# Patient Record
Sex: FEMALE | HISPANIC OR LATINO | URBAN - METROPOLITAN AREA
[De-identification: names, ages, dates, MRNs, and addresses within clinical notes are randomized per-mention and may not be internally consistent; named-entity substitution may affect disease eponyms.]

---

## 2019-09-24 ENCOUNTER — INPATIENT (INPATIENT)
Facility: HOSPITAL | Age: 44
LOS: 2 days | Discharge: HOME | DRG: 532 | End: 2019-09-27
Attending: OBSTETRICS & GYNECOLOGY | Admitting: OBSTETRICS & GYNECOLOGY
Payer: MEDICAID

## 2019-09-24 VITALS — WEIGHT: 169.98 LBS

## 2019-09-24 DIAGNOSIS — N93.9 ABNORMAL UTERINE AND VAGINAL BLEEDING, UNSPECIFIED: ICD-10-CM

## 2019-09-24 LAB
ALBUMIN SERPL ELPH-MCNC: 3.3 G/DL — SIGNIFICANT CHANGE UP (ref 3.3–5)
ALP SERPL-CCNC: 77 U/L — SIGNIFICANT CHANGE UP (ref 40–120)
ALT FLD-CCNC: 16 U/L — SIGNIFICANT CHANGE UP (ref 12–78)
ANION GAP SERPL CALC-SCNC: 10 MMOL/L — SIGNIFICANT CHANGE UP (ref 5–17)
APTT BLD: 23.3 SEC — LOW (ref 27.5–36.3)
AST SERPL-CCNC: 15 U/L — SIGNIFICANT CHANGE UP (ref 15–37)
BASOPHILS # BLD AUTO: 0.01 K/UL — SIGNIFICANT CHANGE UP (ref 0–0.2)
BASOPHILS NFR BLD AUTO: 0.2 % — SIGNIFICANT CHANGE UP (ref 0–2)
BILIRUB SERPL-MCNC: 0.2 MG/DL — SIGNIFICANT CHANGE UP (ref 0.2–1.2)
BUN SERPL-MCNC: 9 MG/DL — SIGNIFICANT CHANGE UP (ref 7–23)
CALCIUM SERPL-MCNC: 8.6 MG/DL — SIGNIFICANT CHANGE UP (ref 8.5–10.1)
CHLORIDE SERPL-SCNC: 106 MMOL/L — SIGNIFICANT CHANGE UP (ref 96–108)
CO2 SERPL-SCNC: 23 MMOL/L — SIGNIFICANT CHANGE UP (ref 22–31)
CREAT SERPL-MCNC: 1.14 MG/DL — SIGNIFICANT CHANGE UP (ref 0.5–1.3)
EOSINOPHIL # BLD AUTO: 0.02 K/UL — SIGNIFICANT CHANGE UP (ref 0–0.5)
EOSINOPHIL NFR BLD AUTO: 0.5 % — SIGNIFICANT CHANGE UP (ref 0–6)
GLUCOSE SERPL-MCNC: 135 MG/DL — HIGH (ref 70–99)
HCG SERPL-ACNC: <1 MIU/ML — SIGNIFICANT CHANGE UP
HCT VFR BLD CALC: 13.4 % — CRITICAL LOW (ref 34.5–45)
HGB BLD-MCNC: 3.5 G/DL — CRITICAL LOW (ref 11.5–15.5)
IMM GRANULOCYTES NFR BLD AUTO: 0.2 % — SIGNIFICANT CHANGE UP (ref 0–1.5)
INR BLD: 1.04 RATIO — SIGNIFICANT CHANGE UP (ref 0.88–1.16)
LIDOCAIN IGE QN: 97 U/L — SIGNIFICANT CHANGE UP (ref 73–393)
LYMPHOCYTES # BLD AUTO: 1.56 K/UL — SIGNIFICANT CHANGE UP (ref 1–3.3)
LYMPHOCYTES # BLD AUTO: 35.9 % — SIGNIFICANT CHANGE UP (ref 13–44)
MCHC RBC-ENTMCNC: 18.8 PG — LOW (ref 27–34)
MCHC RBC-ENTMCNC: 26.1 GM/DL — LOW (ref 32–36)
MCV RBC AUTO: 72 FL — LOW (ref 80–100)
MONOCYTES # BLD AUTO: 0.38 K/UL — SIGNIFICANT CHANGE UP (ref 0–0.9)
MONOCYTES NFR BLD AUTO: 8.8 % — SIGNIFICANT CHANGE UP (ref 2–14)
NEUTROPHILS # BLD AUTO: 2.36 K/UL — SIGNIFICANT CHANGE UP (ref 1.8–7.4)
NEUTROPHILS NFR BLD AUTO: 54.4 % — SIGNIFICANT CHANGE UP (ref 43–77)
PLATELET # BLD AUTO: 159 K/UL — SIGNIFICANT CHANGE UP (ref 150–400)
POTASSIUM SERPL-MCNC: 3.6 MMOL/L — SIGNIFICANT CHANGE UP (ref 3.5–5.3)
POTASSIUM SERPL-SCNC: 3.6 MMOL/L — SIGNIFICANT CHANGE UP (ref 3.5–5.3)
PROT SERPL-MCNC: 6.8 GM/DL — SIGNIFICANT CHANGE UP (ref 6–8.3)
PROTHROM AB SERPL-ACNC: 11.6 SEC — SIGNIFICANT CHANGE UP (ref 10–12.9)
RBC # BLD: 1.86 M/UL — LOW (ref 3.8–5.2)
RBC # FLD: 18.9 % — HIGH (ref 10.3–14.5)
SODIUM SERPL-SCNC: 139 MMOL/L — SIGNIFICANT CHANGE UP (ref 135–145)
WBC # BLD: 4.34 K/UL — SIGNIFICANT CHANGE UP (ref 3.8–10.5)
WBC # FLD AUTO: 4.34 K/UL — SIGNIFICANT CHANGE UP (ref 3.8–10.5)

## 2019-09-24 PROCEDURE — 36415 COLL VENOUS BLD VENIPUNCTURE: CPT

## 2019-09-24 PROCEDURE — 88305 TISSUE EXAM BY PATHOLOGIST: CPT | Mod: 26

## 2019-09-24 PROCEDURE — 85025 COMPLETE CBC W/AUTO DIFF WBC: CPT

## 2019-09-24 PROCEDURE — 90686 IIV4 VACC NO PRSV 0.5 ML IM: CPT

## 2019-09-24 PROCEDURE — 85384 FIBRINOGEN ACTIVITY: CPT

## 2019-09-24 PROCEDURE — 93010 ELECTROCARDIOGRAM REPORT: CPT

## 2019-09-24 PROCEDURE — 85610 PROTHROMBIN TIME: CPT

## 2019-09-24 PROCEDURE — 74177 CT ABD & PELVIS W/CONTRAST: CPT | Mod: 26

## 2019-09-24 PROCEDURE — 76830 TRANSVAGINAL US NON-OB: CPT | Mod: 26

## 2019-09-24 PROCEDURE — P9016: CPT

## 2019-09-24 PROCEDURE — 70450 CT HEAD/BRAIN W/O DYE: CPT | Mod: 26

## 2019-09-24 PROCEDURE — P9059: CPT

## 2019-09-24 PROCEDURE — 80048 BASIC METABOLIC PNL TOTAL CA: CPT

## 2019-09-24 PROCEDURE — C8920: CPT

## 2019-09-24 PROCEDURE — 86920 COMPATIBILITY TEST SPIN: CPT

## 2019-09-24 PROCEDURE — 71045 X-RAY EXAM CHEST 1 VIEW: CPT | Mod: 26

## 2019-09-24 PROCEDURE — A9579: CPT

## 2019-09-24 PROCEDURE — 72197 MRI PELVIS W/O & W/DYE: CPT

## 2019-09-24 PROCEDURE — 85027 COMPLETE CBC AUTOMATED: CPT

## 2019-09-24 PROCEDURE — 36430 TRANSFUSION BLD/BLD COMPNT: CPT

## 2019-09-24 PROCEDURE — 85730 THROMBOPLASTIN TIME PARTIAL: CPT

## 2019-09-24 PROCEDURE — P9037: CPT

## 2019-09-24 PROCEDURE — G0008: CPT

## 2019-09-24 RX ORDER — SODIUM CHLORIDE 9 MG/ML
1000 INJECTION INTRAMUSCULAR; INTRAVENOUS; SUBCUTANEOUS ONCE
Refills: 0 | Status: COMPLETED | OUTPATIENT
Start: 2019-09-24 | End: 2019-09-24

## 2019-09-24 RX ORDER — ONDANSETRON 8 MG/1
4 TABLET, FILM COATED ORAL ONCE
Refills: 0 | Status: COMPLETED | OUTPATIENT
Start: 2019-09-24 | End: 2019-09-24

## 2019-09-24 RX ORDER — SODIUM CHLORIDE 9 MG/ML
1000 INJECTION, SOLUTION INTRAVENOUS
Refills: 0 | Status: DISCONTINUED | OUTPATIENT
Start: 2019-09-24 | End: 2019-09-27

## 2019-09-24 RX ORDER — TRANEXAMIC ACID 100 MG/ML
1000 INJECTION, SOLUTION INTRAVENOUS ONCE
Refills: 0 | Status: COMPLETED | OUTPATIENT
Start: 2019-09-24 | End: 2019-09-24

## 2019-09-24 RX ADMIN — SODIUM CHLORIDE 1000 MILLILITER(S): 9 INJECTION INTRAMUSCULAR; INTRAVENOUS; SUBCUTANEOUS at 13:12

## 2019-09-24 RX ADMIN — SODIUM CHLORIDE 1000 MILLILITER(S): 9 INJECTION INTRAMUSCULAR; INTRAVENOUS; SUBCUTANEOUS at 14:13

## 2019-09-24 RX ADMIN — TRANEXAMIC ACID 660 MILLIGRAM(S): 100 INJECTION, SOLUTION INTRAVENOUS at 18:58

## 2019-09-24 RX ADMIN — SODIUM CHLORIDE 125 MILLILITER(S): 9 INJECTION, SOLUTION INTRAVENOUS at 23:58

## 2019-09-24 RX ADMIN — TRANEXAMIC ACID 13.75 MILLIGRAM(S): 100 INJECTION, SOLUTION INTRAVENOUS at 19:24

## 2019-09-24 RX ADMIN — ONDANSETRON 4 MILLIGRAM(S): 8 TABLET, FILM COATED ORAL at 13:11

## 2019-09-24 NOTE — ED ADULT NURSE NOTE - CHIEF COMPLAINT QUOTE
Patient presents with menorrhagia x many years, now with abdominal pain, HA and SOB.  she attempted to secure and appointment with a MD she was referred to, but was unable.  +Pallor and diaphoresis, c/o dizziness.  Case escalated to the main

## 2019-09-24 NOTE — ED ADULT TRIAGE NOTE - CHIEF COMPLAINT QUOTE
Patient presents with menorrhagia x many years, now with abdominal pain, HA and SOB.  she attempted to secure and appointment with a MD she was referred to, but was unable. Patient presents with menorrhagia x many years, now with abdominal pain, HA and SOB.  she attempted to secure and appointment with a MD she was referred to, but was unable.  +Pallor and diaphoresis, c/o dizziness.  Case escalated to the main Patient presents with menorrhagia x many years, now with abdominal pain, HA and NIELSEN.  she attempted to secure an appointment with a MD she was referred to, but was unable.  +Pallor and diaphoresis, c/o dizziness, though VSS.  Patient appears restless. pacing in triage, assisted to wheelchair.  Case escalated to the main.

## 2019-09-24 NOTE — H&P ADULT - ASSESSMENT
44y F  with severe menorrhagia and symptomatic anemia secondary to acute blood loss requiring total of 4 u PRBCs, negative pregnancy test, vital signs stable and pelvic exam significant for large globular uterus with a dilated cervix with active vaginal bleeding with large clots. Menorrhagia likely secondary to ovulatory dysfunction, cannot rule out endometrial hyperplasia as this time.     [] TVUS repeat   [] add 2 u PRBCs for total of 4, F/U CBC after transfusion  [] Tranexamic acid loading dose followed by maintenance dose   [] 2nd IV line  [] F/u EMB performed at bedside, however likely all clotted blood  [] POssible consent for D&C for endometrial sampling  [] IV F  [] NPO for now for OR

## 2019-09-24 NOTE — ED PROVIDER NOTE - OBJECTIVE STATEMENT
45 y/o F with PMHx of menorrhagia presenting to the ED c/o menorrhagia for the past 2 months. Pt also c/o weakness, abd pain and HA. Pt attempted to secure an appt with MD she was referred to but was unable. Symptoms also associated with pallor, dizziness and diaphoresis. Pt had outpatient sonogram done on 9/12 which showed a 2.7cm cyst on L ovary. States that symptoms started 5 years ago and she has been having intermittent bleeding for "months at a time." Pt has been seen in the hospital and was told that she "may need her uterus removed" but never followed up. +vomiting. +fever. +b/l leg pain. +SOB. States that she is now unable to walk secondary to weakness. Denies tobacco use, frequent EtOH use, or diarrhea. Pt is Malaysian speaking; Int#: [id number].

## 2019-09-24 NOTE — H&P ADULT - NSHPLABSRESULTS_GEN_ALL_CORE
LABS:                        3.5    4.34  )-----------( 159      ( 24 Sep 2019 12:49 )             13.4     09-24    139  |  106  |  9   ----------------------------<  135<H>  3.6   |  23  |  1.14    Ca    8.6      24 Sep 2019 12:49    TPro  6.8  /  Alb  3.3  /  TBili  0.2  /  DBili  x   /  AST  15  /  ALT  16  /  AlkPhos  77  09-24    PT/INR - ( 24 Sep 2019 12:49 )   PT: 11.6 sec;   INR: 1.04 ratio         PTT - ( 24 Sep 2019 12:49 )  PTT:23.3 sec    HCG Quantitative, Serum (09.24.19 @ 12:49)    HCG Quantitative, Serum: <1

## 2019-09-24 NOTE — H&P ADULT - ATTENDING COMMENTS
Pt seen, history obtained, and examined.  At 20:00 pad shows scant blood.  Third unit of blood hanging.   Transexamic acid initial dose completed, infusion currently running.  Hemodynamically stable at present.  Based upon history of 5 year of irregular periods and 2 months of continuous bleeding, pt has adjusted to her anemia  She has been taking oral iron, has never had IV infusion nor prior transfusion.  As she is currently not bleeding, will offer dinner, then keep NPO after midnight.  Consulted with Dr Evans, interventional radiologist who will see patient in am.  With RN, Gregoria whipple, I introduced idea of uterine artery embolization.  This is an alternative to hysterectomy which she should consider.  Zambrano will be placed on endometrial biopsy as one needs to rule out cancer to prepare to perform correct type of hysterectomy.  On exam, lower uterine segment arises out of cervix, therefore subtotal hysterectomy may not be possible.  All questions were answered.  -repeat H/H 4 hour following third unit of blood  -Dr Chaparrita MD1.  is aware of patient. Above signed out to overnight team.

## 2019-09-24 NOTE — H&P ADULT - HISTORY OF PRESENT ILLNESS
GYNECOLOGIC ONCOLOGY CONSULT NOTE    44y F  with severe menorrhagia for the past 2 months reporting worsening dizziness, weakness, heart palpitations and pelvic pain. Patient reports 5 years of heavy vaginal bleeding with periods happening 2-3 per month last 7-14 days each time. Unclear if previously seen at [facility], diagnosed with anovulatory cycles secondary to perimenopausal state.  Patient seen as outpatient yesterday at another GYN clinic, had a TVUS that showed heterogenous uterine contents consistent with fibroid uterus (9mm lining). Patient was sent to Dr. Martin office in [place] and was told she could not be seen. She came to the hospital because she bleeds through 3-4 pads per hour and has bleed through her clothing. She is unable to walk and has difficulty voiding.   Patient denies continuous GYN care in the past. Denies recent pap smear. Denies hsitory of GYN cancers in family. Previous NSVDx2 without complications. No STI testing. Currently sexually active with single new partner, who is interested in conception. She however is concerned with her health and wants a hysterectomy if it is indicated.       OB/GYN HISTORY:    FT Female    FT Male 89     Surgical History:  None    Past Medical History: None    No Known Allergies    tranexamic acid IVPB 1000 milliGRAM(s) IV Intermittent Once  tranexamic acid IVPB 1000 milliGRAM(s) IV Intermittent Once    FAMILY HISTORY: Unknown     Social History: Negative x3       MEDICATIONS  (STANDING):  tranexamic acid IVPB 1000 milliGRAM(s) IV Intermittent Once  tranexamic acid IVPB 1000 milliGRAM(s) IV Intermittent Once GYNECOLOGIC CONSULT NOTE    44y F  with severe menorrhagia for the past 2 months reporting worsening dizziness, weakness, heart palpitations and pelvic pain. Patient reports 5 years of heavy vaginal bleeding with periods happening 2-3 per month last 7-14 days each time. Unclear if previously seen at [facility], diagnosed with anovulatory cycles secondary to perimenopausal state.  Patient seen as outpatient  at another GYN clinic, had a TVUS that showed heterogenous uterine contents consistent with fibroid uterus (9mm lining). Patient was sent to Dr. Martin office in [place] and was told she could not be seen. She came to the hospital because she bleeds through 3-4 pads per hour and has bleed through her clothing. She is unable to walk and has difficulty voiding.   Patient denies continuous GYN care in the past. Denies recent pap smear. Denies hsitory of GYN cancers in family. Previous NSVDx2 without complications. No STI testing. Currently sexually active with single new partner, who is interested in conception. She however is concerned with her health and wants a hysterectomy if it is indicated.       OB/GYN HISTORY:    FT Female    FT Male 89     Surgical History:  None    Past Medical History: None    No Known Allergies    tranexamic acid IVPB 1000 milliGRAM(s) IV Intermittent Once  tranexamic acid IVPB 1000 milliGRAM(s) IV Intermittent Once    FAMILY HISTORY: Unknown     Social History: Negative x3       MEDICATIONS  (STANDING):  tranexamic acid IVPB 1000 milliGRAM(s) IV Intermittent Once  tranexamic acid IVPB 1000 milliGRAM(s) IV Intermittent Once

## 2019-09-24 NOTE — H&P ADULT - NSHPREVIEWOFSYSTEMS_GEN_ALL_CORE
REVIEW OF SYSTEMS:  CONSTITUTIONAL: No fever, weight loss, or fatigue  EYES: No eye pain, visual disturbances, or discharge  ENMT:  No difficulty hearing, tinnitus, vertigo; No sinus or throat pain  NECK: No pain or stiffness  BREASTS: No pain, masses, or nipple discharge  RESPIRATORY: No cough, wheezing, chills or hemoptysis; No shortness of breath  CARDIOVASCULAR: No chest pain, palpitations, dizziness, or leg swelling  GASTROINTESTINAL: No abdominal or epigastric pain. No nausea, vomiting, or hematemesis; No diarrhea or constipation. No melena or hematochezia.  GENITOURINARY: See HPI   NEUROLOGICAL: No headaches, memory loss, loss of strength, numbness, or tremors  SKIN: No itching, burning, rashes, or lesions   LYMPH NODES: No enlarged glands  ENDOCRINE: No heat or cold intolerance; No hair loss  MUSCULOSKELETAL: No joint pain or swelling; No muscle, back, or extremity pain  PSYCHIATRIC: No depression, anxiety, mood swings, or difficulty sleeping  HEME/LYMPH: No easy bruising, or bleeding gums  ALLERY AND IMMUNOLOGIC: No hives or eczema

## 2019-09-24 NOTE — ED ADULT NURSE NOTE - OBJECTIVE STATEMENT
pt is 48 yo female with no pmhx presents to er for vaginal bleeding started 1 year ago, bleeding worse 5-6 months ago,

## 2019-09-24 NOTE — H&P ADULT - NSHPPHYSICALEXAM_GEN_ALL_CORE
Vital Signs Last 24 Hrs  T(C): 37 (24 Sep 2019 17:35), Max: 37.3 (24 Sep 2019 17:17)  T(F): 98.6 (24 Sep 2019 17:35), Max: 99.1 (24 Sep 2019 17:17)  HR: 75 (24 Sep 2019 17:35) (73 - 101)  BP: 119/54 (24 Sep 2019 17:35) (111/42 - 139/65)  RR: 20 (24 Sep 2019 17:35) (18 - 20)  SpO2: 100% (24 Sep 2019 17:35) (100% - 100%)    Examination with bedside chaperone.   GENERAL: dizzy, well-developed  HEAD:  Atraumatic, Normocephalic  NERVOUS SYSTEM:  Alert & Oriented X3, Good concentration  CHEST/LUNG: Clear to percussion bilaterally; No rales, rhonchi, wheezing, or rubs  HEART: Regular rate and rhythm; No murmurs, rubs, or gallops  ABDOMEN: Soft, Nontender, Nondistended; Bowel sounds present, No rebound, No guarding  EXTREMITIES:  2+ Peripheral Pulses, No clubbing, cyanosis, or edema, Redd's sign negative  SKIN: No rashes or lesions  PELVIC: no lesions, no masses, on SPE vaginal vault with pooling bright red blood with large clots from cervical os, EMB attempted x3 (all samples collected for pathology, on bimanual exam, cervical os dilated 2cm with 14 week size soft globular uterus, adnexa non palpable   RECTAL: deferred

## 2019-09-25 LAB
ANION GAP SERPL CALC-SCNC: 8 MMOL/L — SIGNIFICANT CHANGE UP (ref 5–17)
BASOPHILS # BLD AUTO: 0.01 K/UL — SIGNIFICANT CHANGE UP (ref 0–0.2)
BASOPHILS NFR BLD AUTO: 0.2 % — SIGNIFICANT CHANGE UP (ref 0–2)
BUN SERPL-MCNC: 7 MG/DL — SIGNIFICANT CHANGE UP (ref 7–23)
CALCIUM SERPL-MCNC: 8.6 MG/DL — SIGNIFICANT CHANGE UP (ref 8.5–10.1)
CHLORIDE SERPL-SCNC: 111 MMOL/L — HIGH (ref 96–108)
CO2 SERPL-SCNC: 23 MMOL/L — SIGNIFICANT CHANGE UP (ref 22–31)
CREAT SERPL-MCNC: 0.79 MG/DL — SIGNIFICANT CHANGE UP (ref 0.5–1.3)
EOSINOPHIL # BLD AUTO: 0.02 K/UL — SIGNIFICANT CHANGE UP (ref 0–0.5)
EOSINOPHIL NFR BLD AUTO: 0.3 % — SIGNIFICANT CHANGE UP (ref 0–6)
FIBRINOGEN PPP-MCNC: 402 MG/DL — SIGNIFICANT CHANGE UP (ref 330–470)
GLUCOSE SERPL-MCNC: 99 MG/DL — SIGNIFICANT CHANGE UP (ref 70–99)
HCT VFR BLD CALC: 24.2 % — LOW (ref 34.5–45)
HCT VFR BLD CALC: 32.8 % — LOW (ref 34.5–45)
HGB BLD-MCNC: 10.5 G/DL — LOW (ref 11.5–15.5)
HGB BLD-MCNC: 7.3 G/DL — LOW (ref 11.5–15.5)
IMM GRANULOCYTES NFR BLD AUTO: 0.3 % — SIGNIFICANT CHANGE UP (ref 0–1.5)
LYMPHOCYTES # BLD AUTO: 1.68 K/UL — SIGNIFICANT CHANGE UP (ref 1–3.3)
LYMPHOCYTES # BLD AUTO: 28.9 % — SIGNIFICANT CHANGE UP (ref 13–44)
MCHC RBC-ENTMCNC: 24.4 PG — LOW (ref 27–34)
MCHC RBC-ENTMCNC: 25.9 PG — LOW (ref 27–34)
MCHC RBC-ENTMCNC: 30.2 GM/DL — LOW (ref 32–36)
MCHC RBC-ENTMCNC: 32 GM/DL — SIGNIFICANT CHANGE UP (ref 32–36)
MCV RBC AUTO: 80.8 FL — SIGNIFICANT CHANGE UP (ref 80–100)
MCV RBC AUTO: 80.9 FL — SIGNIFICANT CHANGE UP (ref 80–100)
MONOCYTES # BLD AUTO: 0.3 K/UL — SIGNIFICANT CHANGE UP (ref 0–0.9)
MONOCYTES NFR BLD AUTO: 5.2 % — SIGNIFICANT CHANGE UP (ref 2–14)
NEUTROPHILS # BLD AUTO: 3.79 K/UL — SIGNIFICANT CHANGE UP (ref 1.8–7.4)
NEUTROPHILS NFR BLD AUTO: 65.1 % — SIGNIFICANT CHANGE UP (ref 43–77)
PLATELET # BLD AUTO: 125 K/UL — LOW (ref 150–400)
PLATELET # BLD AUTO: 131 K/UL — LOW (ref 150–400)
POTASSIUM SERPL-MCNC: 4.6 MMOL/L — SIGNIFICANT CHANGE UP (ref 3.5–5.3)
POTASSIUM SERPL-SCNC: 4.6 MMOL/L — SIGNIFICANT CHANGE UP (ref 3.5–5.3)
RBC # BLD: 2.99 M/UL — LOW (ref 3.8–5.2)
RBC # BLD: 4.06 M/UL — SIGNIFICANT CHANGE UP (ref 3.8–5.2)
RBC # FLD: 17.1 % — HIGH (ref 10.3–14.5)
RBC # FLD: 18.4 % — HIGH (ref 10.3–14.5)
SODIUM SERPL-SCNC: 142 MMOL/L — SIGNIFICANT CHANGE UP (ref 135–145)
WBC # BLD: 5.75 K/UL — SIGNIFICANT CHANGE UP (ref 3.8–10.5)
WBC # BLD: 5.82 K/UL — SIGNIFICANT CHANGE UP (ref 3.8–10.5)
WBC # FLD AUTO: 5.75 K/UL — SIGNIFICANT CHANGE UP (ref 3.8–10.5)
WBC # FLD AUTO: 5.82 K/UL — SIGNIFICANT CHANGE UP (ref 3.8–10.5)

## 2019-09-25 RX ORDER — ACETAMINOPHEN 500 MG
650 TABLET ORAL EVERY 6 HOURS
Refills: 0 | Status: DISCONTINUED | OUTPATIENT
Start: 2019-09-25 | End: 2019-09-27

## 2019-09-25 RX ORDER — ACETAMINOPHEN 500 MG
650 TABLET ORAL EVERY 6 HOURS
Refills: 0 | Status: DISCONTINUED | OUTPATIENT
Start: 2019-09-25 | End: 2019-09-25

## 2019-09-25 RX ORDER — INFLUENZA VIRUS VACCINE 15; 15; 15; 15 UG/.5ML; UG/.5ML; UG/.5ML; UG/.5ML
0.5 SUSPENSION INTRAMUSCULAR ONCE
Refills: 0 | Status: COMPLETED | OUTPATIENT
Start: 2019-09-25 | End: 2019-09-27

## 2019-09-25 RX ADMIN — SODIUM CHLORIDE 125 MILLILITER(S): 9 INJECTION, SOLUTION INTRAVENOUS at 22:09

## 2019-09-25 RX ADMIN — Medication 650 MILLIGRAM(S): at 17:57

## 2019-09-25 RX ADMIN — Medication 650 MILLIGRAM(S): at 17:58

## 2019-09-25 NOTE — PROGRESS NOTE ADULT - ASSESSMENT
A/P   HD#2 admitted for severe anemia and menorrhagia  -Continue receiving blood products   -: voiding spontaneously  -GI: BM and flatus present  -DVT ppx: SCDs  - Plan pending blood products and AM discussion

## 2019-09-25 NOTE — PROGRESS NOTE ADULT - SUBJECTIVE AND OBJECTIVE BOX
HD#2 admitted for menorrhagia and severe anemia    S:    Patient was seen and examined at bedside.   Currently receiving blood products  The patient is doing better.   Pain is controlled.   NPO,   patient has been urinating.   Patient has not been ambulating.   Reports BM.    O:   T(C): 37 (09-25-19 @ 04:45), Max: 37.3 (09-24-19 @ 17:17)  HR: 62 (09-25-19 @ 04:45) (62 - 101)  BP: 112/61 (09-25-19 @ 04:45) (111/42 - 139/65)  RR: 18 (09-25-19 @ 04:45) (18 - 23)  SpO2: 100% (09-25-19 @ 04:45) (97% - 100%)    CV: RRR  Abdomen: soft, nontender, + BS   VE: less blood noted on pads today    influenza   Vaccine 0.5 milliLiter(s) IntraMuscular once  lactated ringers. 1000 milliLiter(s) IV Continuous <Continuous>

## 2019-09-26 DIAGNOSIS — D64.9 ANEMIA, UNSPECIFIED: ICD-10-CM

## 2019-09-26 LAB
ANION GAP SERPL CALC-SCNC: 7 MMOL/L — SIGNIFICANT CHANGE UP (ref 5–17)
APTT BLD: 25.7 SEC — LOW (ref 27.5–36.3)
BUN SERPL-MCNC: 9 MG/DL — SIGNIFICANT CHANGE UP (ref 7–23)
CALCIUM SERPL-MCNC: 8.5 MG/DL — SIGNIFICANT CHANGE UP (ref 8.5–10.1)
CHLORIDE SERPL-SCNC: 109 MMOL/L — HIGH (ref 96–108)
CO2 SERPL-SCNC: 26 MMOL/L — SIGNIFICANT CHANGE UP (ref 22–31)
CREAT SERPL-MCNC: 0.72 MG/DL — SIGNIFICANT CHANGE UP (ref 0.5–1.3)
GLUCOSE SERPL-MCNC: 99 MG/DL — SIGNIFICANT CHANGE UP (ref 70–99)
HCT VFR BLD CALC: 29.9 % — LOW (ref 34.5–45)
HCT VFR BLD CALC: 30.4 % — LOW (ref 34.5–45)
HGB BLD-MCNC: 9.4 G/DL — LOW (ref 11.5–15.5)
HGB BLD-MCNC: 9.9 G/DL — LOW (ref 11.5–15.5)
INR BLD: 1.11 RATIO — SIGNIFICANT CHANGE UP (ref 0.88–1.16)
MCHC RBC-ENTMCNC: 25.8 PG — LOW (ref 27–34)
MCHC RBC-ENTMCNC: 26.3 PG — LOW (ref 27–34)
MCHC RBC-ENTMCNC: 31.4 GM/DL — LOW (ref 32–36)
MCHC RBC-ENTMCNC: 32.6 GM/DL — SIGNIFICANT CHANGE UP (ref 32–36)
MCV RBC AUTO: 80.6 FL — SIGNIFICANT CHANGE UP (ref 80–100)
MCV RBC AUTO: 81.9 FL — SIGNIFICANT CHANGE UP (ref 80–100)
PLATELET # BLD AUTO: 113 K/UL — LOW (ref 150–400)
PLATELET # BLD AUTO: 113 K/UL — LOW (ref 150–400)
POTASSIUM SERPL-MCNC: 3.9 MMOL/L — SIGNIFICANT CHANGE UP (ref 3.5–5.3)
POTASSIUM SERPL-SCNC: 3.9 MMOL/L — SIGNIFICANT CHANGE UP (ref 3.5–5.3)
PROTHROM AB SERPL-ACNC: 12.4 SEC — SIGNIFICANT CHANGE UP (ref 10–12.9)
RBC # BLD: 3.65 M/UL — LOW (ref 3.8–5.2)
RBC # BLD: 3.77 M/UL — LOW (ref 3.8–5.2)
RBC # FLD: 17.3 % — HIGH (ref 10.3–14.5)
RBC # FLD: 17.6 % — HIGH (ref 10.3–14.5)
SODIUM SERPL-SCNC: 142 MMOL/L — SIGNIFICANT CHANGE UP (ref 135–145)
WBC # BLD: 4.24 K/UL — SIGNIFICANT CHANGE UP (ref 3.8–10.5)
WBC # BLD: 4.3 K/UL — SIGNIFICANT CHANGE UP (ref 3.8–10.5)
WBC # FLD AUTO: 4.24 K/UL — SIGNIFICANT CHANGE UP (ref 3.8–10.5)
WBC # FLD AUTO: 4.3 K/UL — SIGNIFICANT CHANGE UP (ref 3.8–10.5)

## 2019-09-26 PROCEDURE — 72197 MRI PELVIS W/O & W/DYE: CPT | Mod: 26

## 2019-09-26 PROCEDURE — 99232 SBSQ HOSP IP/OBS MODERATE 35: CPT

## 2019-09-26 PROCEDURE — 72198 MR ANGIO PELVIS W/O & W/DYE: CPT | Mod: 26,59

## 2019-09-26 RX ORDER — MEDROXYPROGESTERONE ACETATE 150 MG/ML
10 INJECTION, SUSPENSION, EXTENDED RELEASE INTRAMUSCULAR
Refills: 0 | Status: DISCONTINUED | OUTPATIENT
Start: 2019-09-26 | End: 2019-09-27

## 2019-09-26 RX ADMIN — SODIUM CHLORIDE 125 MILLILITER(S): 9 INJECTION, SOLUTION INTRAVENOUS at 20:31

## 2019-09-26 RX ADMIN — MEDROXYPROGESTERONE ACETATE 10 MILLIGRAM(S): 150 INJECTION, SUSPENSION, EXTENDED RELEASE INTRAMUSCULAR at 17:27

## 2019-09-26 RX ADMIN — MEDROXYPROGESTERONE ACETATE 10 MILLIGRAM(S): 150 INJECTION, SUSPENSION, EXTENDED RELEASE INTRAMUSCULAR at 23:26

## 2019-09-26 RX ADMIN — SODIUM CHLORIDE 125 MILLILITER(S): 9 INJECTION, SOLUTION INTRAVENOUS at 12:28

## 2019-09-26 RX ADMIN — Medication 650 MILLIGRAM(S): at 16:27

## 2019-09-26 NOTE — PROGRESS NOTE ADULT - SUBJECTIVE AND OBJECTIVE BOX
Case reviewed with Dr. Ma, Dr. Parra, Dr. Bustamante and IR  Patient feeling better post blood transfusion. Serial CBCs with stable Hgb and HCT.   Patient is still having vaginal bleeding be with decreased amount.   Plan to start patient on Aygestin 5mg BID PO as outpatient to further optimize to surgery.   Dr. Parra has agreed to accept this patient for his GYN practice and will be planning to perform a Lap hysterectomy.  Will give patient Provera while inpatient and will likely discharge patient tomorrow if AM CBC is WNL and hemodynamically stable.       Antwon MAO PGY3  Dr. Robby Parra

## 2019-09-26 NOTE — PROGRESS NOTE ADULT - SUBJECTIVE AND OBJECTIVE BOX
HD#3 admitted for menorrhagia and severe anemia, s/p 4uPRBCs, 2 FFP, 2 Platelets,     S:    No acute events overnight.   Patient was seen and examined at bedside.   Patient is feeling better this morning. States she feels more energy and feels like she has more color in her face.   Denies HA, SOB, CP, heart palpitations or paresthesias.   Pain is controlled with PRN medications.   She is tolerating a regular diet, voiding spontaneously and ambulating without assistance.   She still has vaginal bleeding with clots but decreased in amount.   + flatus and +BM    O:   Vital Signs Last 24 Hrs  T(C): 36.9 (26 Sep 2019 05:55), Max: 37.6 (25 Sep 2019 16:42)  T(F): 98.5 (26 Sep 2019 05:55), Max: 99.6 (25 Sep 2019 16:42)  HR: 61 (26 Sep 2019 05:55) (57 - 69)  BP: 111/44 (26 Sep 2019 05:55) (111/44 - 143/74)  RR: 18 (26 Sep 2019 05:55) (16 - 19)  SpO2: 100% (26 Sep 2019 05:55) (99% - 100%)    CV: RRR  Lungs: CTAB  Abdomen: soft, nontender, + BS   VE: less blood noted on pads today                          9.4    4.30  )-----------( 113      ( 26 Sep 2019 08:40 )             29.9 ,                       10.5   5.75  )-----------( 125      ( 25 Sep 2019 15:25 )             32.8 ,                       7.3    5.82  )-----------( 131      ( 25 Sep 2019 01:23 )             24.2 ,                       3.5    4.34  )-----------( 159      ( 24 Sep 2019 12:49 )             13.4       MEDICATIONS  (STANDING):  influenza   Vaccine 0.5 milliLiter(s) IntraMuscular once  lactated ringers. 1000 milliLiter(s) (125 mL/Hr) IV Continuous <Continuous>  medroxyPROGESTERone 10 milliGRAM(s) Oral <User Schedule>      MRI pending today.

## 2019-09-26 NOTE — PROGRESS NOTE ADULT - PROBLEM SELECTOR PLAN 2
- S/p blood products with adequate response  - Still active vaginal bleeding  - Dropped Hgb 10.5 --> 9.4 will repeat CBC in PM, may require additional blood products

## 2019-09-26 NOTE — CONSULT NOTE ADULT - SUBJECTIVE AND OBJECTIVE BOX
Vascular & Interventional Radiology Consult Note    HPI: 44y Female with severe chronic menorrhagia with admission HGB of 3.5. IR consulted to comment on utility of UAE in setting of adenomyosis. MRI performed today showing no leiomyomata, extensive adenomyosis.     Allergies:   Medications (Abx/Cardiac/Anticoagulation/Blood Products)  tranexamic acid IVPB: 660 mL/Hr IV Intermittent (09-24 @ 18:58)  tranexamic acid IVPB: 13.75 mL/Hr IV Intermittent (09-24 @ 19:24)    Data:    T(C): 36.9  HR: 61  BP: 111/44  RR: 18  SpO2: 100%    Exam  General: Deferred    -WBC 4.24 / HgB 9.9 / Hct 30.4 / Plt 113  -Na 142 / Cl 109 / BUN 9 / Glucose 99  -K 3.9 / CO2 26 / Cr 0.72  -ALT -- / Alk Phos -- / T.Bili --  -INR1.11    Imaging: MRI reviewed with Dr. Vincent in radiology. Severe diffuse adenomyosis, no leiomyomas. Other findings as in report.     A:   - 44y Female with chronic severe menorrhagia 2/2 adenomyosis resulting in severe blood loss anemia  - Purely adenomyosis, without leiomyomas, on MRI today  - In the setting of pure adenomyosis, hysterectomy and hormonal therapy are first  and second line treatments, respectively. UAE is primarily performed in cases where there are both leiomyomas and adenomyosis, or if there is a contraindication or failure of the aforementioned therapies.      Plan:  - No plans for UAE at the moment  - IR available to offer UAE pending further discussion re: above

## 2019-09-26 NOTE — PROGRESS NOTE ADULT - ASSESSMENT
43yo  w/ history of severe menorrhagia, HD#3 admitted for menorrhagia x 2months and severe anemia (Hgb 3.8), s/p 4uPRBCs, 2 FFP, 2 Platelets, now pending MRI for possible IR UAE, pending EMB in pathology.     [] MRI pending today  [] Pending IR consult for UAE (likely wound need to happen as inpatient given no insurance)  [] Heme: Hgb 9.4 and actively bleeding, will begin Provera 10mg TID PO treatment  [] Pathology: pending expedited pathology   [] : voiding spontaneously  [] GI: BM and flatus present  [] DVT ppx: SCDs, encouraged ambulation   [] Per Dr. Ma, once stable and once pathology is back, possible consent for hysterectomy

## 2019-09-27 VITALS
HEART RATE: 82 BPM | TEMPERATURE: 99 F | SYSTOLIC BLOOD PRESSURE: 135 MMHG | OXYGEN SATURATION: 95 % | DIASTOLIC BLOOD PRESSURE: 57 MMHG | RESPIRATION RATE: 17 BRPM

## 2019-09-27 LAB
ANION GAP SERPL CALC-SCNC: 8 MMOL/L — SIGNIFICANT CHANGE UP (ref 5–17)
BUN SERPL-MCNC: 11 MG/DL — SIGNIFICANT CHANGE UP (ref 7–23)
CALCIUM SERPL-MCNC: 8.7 MG/DL — SIGNIFICANT CHANGE UP (ref 8.5–10.1)
CHLORIDE SERPL-SCNC: 106 MMOL/L — SIGNIFICANT CHANGE UP (ref 96–108)
CO2 SERPL-SCNC: 26 MMOL/L — SIGNIFICANT CHANGE UP (ref 22–31)
CREAT SERPL-MCNC: 0.76 MG/DL — SIGNIFICANT CHANGE UP (ref 0.5–1.3)
GLUCOSE SERPL-MCNC: 84 MG/DL — SIGNIFICANT CHANGE UP (ref 70–99)
HCT VFR BLD CALC: 31.2 % — LOW (ref 34.5–45)
HGB BLD-MCNC: 9.7 G/DL — LOW (ref 11.5–15.5)
MCHC RBC-ENTMCNC: 25.7 PG — LOW (ref 27–34)
MCHC RBC-ENTMCNC: 31.1 GM/DL — LOW (ref 32–36)
MCV RBC AUTO: 82.5 FL — SIGNIFICANT CHANGE UP (ref 80–100)
PLATELET # BLD AUTO: 123 K/UL — LOW (ref 150–400)
POTASSIUM SERPL-MCNC: 3.9 MMOL/L — SIGNIFICANT CHANGE UP (ref 3.5–5.3)
POTASSIUM SERPL-SCNC: 3.9 MMOL/L — SIGNIFICANT CHANGE UP (ref 3.5–5.3)
RBC # BLD: 3.78 M/UL — LOW (ref 3.8–5.2)
RBC # FLD: 17.8 % — HIGH (ref 10.3–14.5)
SODIUM SERPL-SCNC: 140 MMOL/L — SIGNIFICANT CHANGE UP (ref 135–145)
WBC # BLD: 4.95 K/UL — SIGNIFICANT CHANGE UP (ref 3.8–10.5)
WBC # FLD AUTO: 4.95 K/UL — SIGNIFICANT CHANGE UP (ref 3.8–10.5)

## 2019-09-27 RX ORDER — DOCUSATE SODIUM 100 MG
1 CAPSULE ORAL
Qty: 28 | Refills: 0
Start: 2019-09-27 | End: 2019-10-10

## 2019-09-27 RX ORDER — FERROUS SULFATE 325(65) MG
1 TABLET ORAL
Qty: 28 | Refills: 0
Start: 2019-09-27 | End: 2019-10-10

## 2019-09-27 RX ORDER — NORETHINDRONE 0.35 MG/1
1 TABLET ORAL
Qty: 42 | Refills: 0
Start: 2019-09-27 | End: 2019-10-17

## 2019-09-27 RX ADMIN — INFLUENZA VIRUS VACCINE 0.5 MILLILITER(S): 15; 15; 15; 15 SUSPENSION INTRAMUSCULAR at 12:46

## 2019-09-27 RX ADMIN — SODIUM CHLORIDE 125 MILLILITER(S): 9 INJECTION, SOLUTION INTRAVENOUS at 05:06

## 2019-09-27 RX ADMIN — MEDROXYPROGESTERONE ACETATE 10 MILLIGRAM(S): 150 INJECTION, SUSPENSION, EXTENDED RELEASE INTRAMUSCULAR at 08:05

## 2019-09-27 NOTE — DISCHARGE NOTE PROVIDER - NSDCCPCAREPLAN_GEN_ALL_CORE_FT
PRINCIPAL DISCHARGE DIAGNOSIS  Diagnosis: Abnormal uterine bleeding  Assessment and Plan of Treatment: Abnormal uterine bleeding

## 2019-09-27 NOTE — DISCHARGE NOTE PROVIDER - HOSPITAL COURSE
Patient was had blood products transfused during this admission. Patient is now hemodynamically stable. Patient has an endometrial biopsy performed at bedside, with a pathology report that is still pending. Patient has an MRI that confirmed a diagnosis of adenomyosis. Patient was started on hormone therapy to stop her vaginal bleeding. At the time of discharge, vaginal bleeding stopped. Her pain was well controlled. She is tolerating a regular diet. She is ambulating independently. She was voiding spontaneously. Patient understands she will followup with Dr. Parra on Monday for surgical planning. All precautions were given and all questions were answered. Patient was had blood products transfused during this admission. Patient is now hemodynamically stable. Patient has an endometrial biopsy performed at bedside, with a pathology report that is still pending. Patient has an MRI that confirmed a diagnosis of adenomyosis. Patient was started on hormone therapy to stop her vaginal bleeding. At the time of discharge, vaginal bleeding stopped. Her pain was well controlled. She is tolerating a regular diet. She is ambulating independently. She was voiding spontaneously. Patient understands she will followup with Dr. Parra on Monday September 30th at 4:15PM for surgical planning. All precautions were given and all questions were answered.

## 2019-09-27 NOTE — PROGRESS NOTE ADULT - ASSESSMENT
43yo  w/ history of severe menorrhagia, HD#3 admitted for menorrhagia x 2months and severe anemia (Hgb 3.8), s/p 4uPRBCs, 2 FFP, 2 Platelets, now pending MRI for possible IR UAE, pending EMB in pathology.     [] MRI pending today  [] Pending IR consult for UAE (likely wound need to happen as inpatient given no insurance)  [] Heme: Hgb 9.4 and actively bleeding, will begin Provera 10mg TID PO treatment  [] Pathology: pending expedited pathology   [] : voiding spontaneously  [] GI: BM and flatus present  [] DVT ppx: SCDs, encouraged ambulation   [] Per Dr. Ma, once stable and once pathology is back, possible consent for hysterectomy 45yo  w/ history of severe menorrhagia, HD#4 admitted for menorrhagia x 2months and severe anemia (Hgb 3.8), s/p 4uPRBCs, 2 FFP, 2 Platelets, pending EMB in pathology, MRI significant for adenomyosis     [] MRI completed . Benign findings.   [] Heme: Hgb 9.7 and stable since yesterday. No active vaginal bleeding. Continues with Provera 10mg TID PO treatment  [] IR consult for UAE completed. No plan for procedure at this time.  [] Pathology: pending expedited pathology   [] : voiding spontaneously  [] GI: BM and flatus present  [] DVT ppx: SCDs, encouraged ambulation   [] Dr. Parra will followup with this patient as outpatient for surgical planning.

## 2019-09-27 NOTE — PROGRESS NOTE ADULT - REASON FOR ADMISSION
severe menorrhagia, symptomatic acute blood loss anemia

## 2019-09-27 NOTE — PROGRESS NOTE ADULT - SUBJECTIVE AND OBJECTIVE BOX
HD#4 admitted for menorrhagia and severe anemia, s/p 4uPRBCs, 2 FFP, 2 Platelets,     S:    No acute events overnight.   Patient was seen and examined at bedside.   Patient is feeling better this morning. States she feels more energy and feels like she has more color in her face.   Denies HA, SOB, CP, heart palpitations or paresthesias.   Pain is controlled with PRN medications.   She is tolerating a regular diet, voiding spontaneously and ambulating without assistance.   She still has vaginal bleeding with clots but decreased in amount.   + flatus and +BM    O:   Vital Signs Last 24 Hrs  T(C): 36.9 (26 Sep 2019 05:55), Max: 37.6 (25 Sep 2019 16:42)  T(F): 98.5 (26 Sep 2019 05:55), Max: 99.6 (25 Sep 2019 16:42)  HR: 61 (26 Sep 2019 05:55) (57 - 69)  BP: 111/44 (26 Sep 2019 05:55) (111/44 - 143/74)  RR: 18 (26 Sep 2019 05:55) (16 - 19)  SpO2: 100% (26 Sep 2019 05:55) (99% - 100%)    CV: RRR  Lungs: CTAB  Abdomen: soft, nontender, + BS   VE: less blood noted on pads today                          9.4    4.30  )-----------( 113      ( 26 Sep 2019 08:40 )             29.9 ,                       10.5   5.75  )-----------( 125      ( 25 Sep 2019 15:25 )             32.8 ,                       7.3    5.82  )-----------( 131      ( 25 Sep 2019 01:23 )             24.2 ,                       3.5    4.34  )-----------( 159      ( 24 Sep 2019 12:49 )             13.4       MEDICATIONS  (STANDING):  influenza   Vaccine 0.5 milliLiter(s) IntraMuscular once  lactated ringers. 1000 milliLiter(s) (125 mL/Hr) IV Continuous <Continuous>  medroxyPROGESTERone 10 milliGRAM(s) Oral <User Schedule>      MRI pending today. HD#4 admitted for menorrhagia and severe anemia, s/p 4uPRBCs, 2 FFP, 2 Platelets,     S:    No acute events overnight.   Patient was seen and examined at bedside.   Patient is feeling well and more energized.   Denies HA, SOB, CP, heart palpitations or paresthesias.   No complaints. Denies pain.   She is tolerating a regular diet, voiding spontaneously and ambulating without assistance.   She reports mild spotting on pads. No active vaginal bleeding.    + flatus and +BM    O:   Vital Signs Last 24 Hrs  T(C): 36.8 (27 Sep 2019 05:11), Max: 37.6 (26 Sep 2019 17:16)  T(F): 98.2 (27 Sep 2019 05:11), Max: 99.6 (26 Sep 2019 17:16)  HR: 60 (27 Sep 2019 05:11) (57 - 71)  BP: 129/57 (27 Sep 2019 05:11) (116/48 - 134/57)  RR: 18 (27 Sep 2019 05:11) (18 - 18)  SpO2: 100% (27 Sep 2019 05:11) (99% - 100%)      CV: RRR  Lungs: CTAB  Abdomen: soft, nontender, + BS   VE: scant spotting on vaginal pad                           9.7    4.95  )-----------( 123      ( 27 Sep 2019 07:35 )             31.2 ,                       9.9    4.24  )-----------( 113      ( 26 Sep 2019 12:09 )             30.4 ,                       9.4    4.30  )-----------( 113      ( 26 Sep 2019 08:40 )             29.9 ,                       10.5   5.75  )-----------( 125      ( 25 Sep 2019 15:25 )             32.8     MEDICATIONS  (STANDING):  influenza   Vaccine 0.5 milliLiter(s) IntraMuscular once  lactated ringers. 1000 milliLiter(s) (125 mL/Hr) IV Continuous <Continuous>  medroxyPROGESTERone 10 milliGRAM(s) Oral <User Schedule>        < from: MR Angio Pelvis w/wo IV Cont (09.26.19 @ 11:08) >  EXAM:  MR ANGIO PELVIS WAW IC                          EXAM:  MR PELVIS WAW IC                            PROCEDURE DATE:  09/26/2019          INTERPRETATION:  CLINICAL INFORMATION: Severe menorrhagia. Suspected   fibroids versus adenomyosis.    COMPARISON: CT abdomen pelvis    PROCEDURE:   MRI/MRI angiogram of the pelvis was performed with and without   intravenous contrast. 3-D MIPS reconstructions were utilized.  IV Contrast: Gadavist.     FINDINGS:    UTERUS: Retroverted measuring 9 x 6.9 x7.7 cm. The cervix demonstrates   multiple nabothian cysts but is otherwise within normal limits. No focal   fibroids are identified.  ENDOMETRIUM: 4 mm.  JUNCTIONAL ZONE: Diffusely thickened with multiple T2 hyperintense foci   throughout, compatible with adenomyosis.    RIGHT OVARY: Within normal limits.  LEFT OVARY: Within normal limits. There is a 2.8 x 2.4 cm left ovarian   cyst, likely a dominant follicle.  ADNEXA: Within normal limits.    BLADDER: Within normal limits.    LYMPH NODES: No pelvic lymphadenopathy.    VISUALIZED PORTIONS:    ABDOMINAL ORGANS: Within normal limits.  BOWEL: Within normal limits.  PERITONEUM: No ascites.  VESSELS: The iliac arteries and veins are within normal limits.  ABDOMINAL WALL: Within normal limits.  BONES: Normal bone marrow signal.    IMPRESSION:     Adenomyosis of the uterus.  2.8 cm dominant follicle in the left ovary.      < end of copied text >

## 2019-09-27 NOTE — PROGRESS NOTE ADULT - PROBLEM SELECTOR PLAN 1
- Provera 10mg TID starting today  - MRI pending  - IR consult for [place] pending - Provera 10mg TID starting today  - No active vaginal bleeding

## 2019-09-27 NOTE — DISCHARGE NOTE PROVIDER - CARE PROVIDER_API CALL
Raji Parra)  Obstetrics and Gynecology  [address] [place] [address]  Phone: [phone]  Fax: [phone]  Follow Up Time:

## 2019-09-27 NOTE — DISCHARGE NOTE PROVIDER - NSDCFUADDAPPT_GEN_ALL_CORE_FT
La paciente tiene kali alban con el Dr. Parra a las 4:15pm el Lunes Septiembre 30th en la officina de [place]. Dirrecion: [address] [place] [address].

## 2019-09-27 NOTE — DISCHARGE NOTE PROVIDER - NSDCFUADDINST_GEN_ALL_CORE_FT
Por favor agnieszka las pastilla Aygestin 5mg 2 veces al john. Por favor continuar tomando el eamon. Por favor pedir a la farmacia el polvo para el estrenimiento.

## 2019-09-27 NOTE — DISCHARGE NOTE NURSING/CASE MANAGEMENT/SOCIAL WORK - NSDCFUADDAPPT_GEN_ALL_CORE_FT
La paciente tiene kali alban con el Dr. Parra a las 4:15pm el Lunes Septiembre 30th en la officina de [place]. Dirrecion: [address] [place] [address].

## 2019-09-27 NOTE — PROGRESS NOTE ADULT - PROBLEM SELECTOR PLAN 2
- S/p blood products with adequate response  - Still active vaginal bleeding  - Dropped Hgb 10.5 --> 9.4 will repeat CBC in PM, may require additional blood products - S/p blood products with adequate response  - No active bleeding  - Stable at Hgb 9.7, no additional blood requirements.

## 2019-09-27 NOTE — DISCHARGE NOTE NURSING/CASE MANAGEMENT/SOCIAL WORK - PATIENT PORTAL LINK FT
You can access the FollowMyHealth Patient Portal offered by [facility] by registering at the following website: [email or web address]. By joining [email or web address]’s FollowMyHealth portal, you will also be able to view your health information using other applications (apps) compatible with our system.

## 2019-10-01 DIAGNOSIS — N93.9 ABNORMAL UTERINE AND VAGINAL BLEEDING, UNSPECIFIED: ICD-10-CM

## 2019-10-01 DIAGNOSIS — N80.0 ENDOMETRIOSIS OF UTERUS: ICD-10-CM

## 2019-10-01 DIAGNOSIS — N83.202 UNSPECIFIED OVARIAN CYST, LEFT SIDE: ICD-10-CM

## 2019-10-01 DIAGNOSIS — D62 ACUTE POSTHEMORRHAGIC ANEMIA: ICD-10-CM

## 2019-10-16 PROBLEM — N92.0 EXCESSIVE AND FREQUENT MENSTRUATION WITH REGULAR CYCLE: Chronic | Status: ACTIVE | Noted: 2019-09-27

## 2019-10-16 PROBLEM — E66.9 OBESITY, UNSPECIFIED: Chronic | Status: ACTIVE | Noted: 2019-09-24

## 2019-10-31 ENCOUNTER — EMERGENCY (EMERGENCY)
Facility: HOSPITAL | Age: 44
LOS: 0 days | Discharge: HOME | End: 2019-10-31
Attending: EMERGENCY MEDICINE
Payer: SELF-PAY

## 2019-10-31 VITALS
OXYGEN SATURATION: 100 % | DIASTOLIC BLOOD PRESSURE: 62 MMHG | HEART RATE: 89 BPM | RESPIRATION RATE: 18 BRPM | TEMPERATURE: 98 F | SYSTOLIC BLOOD PRESSURE: 143 MMHG

## 2019-10-31 VITALS — HEIGHT: 60 IN | WEIGHT: 169.09 LBS

## 2019-10-31 DIAGNOSIS — N93.9 ABNORMAL UTERINE AND VAGINAL BLEEDING, UNSPECIFIED: ICD-10-CM

## 2019-10-31 DIAGNOSIS — N92.0 EXCESSIVE AND FREQUENT MENSTRUATION WITH REGULAR CYCLE: ICD-10-CM

## 2019-10-31 LAB
ADD ON TEST-SPECIMEN IN LAB: SIGNIFICANT CHANGE UP
ALBUMIN SERPL ELPH-MCNC: 3.6 G/DL — SIGNIFICANT CHANGE UP (ref 3.3–5)
ALP SERPL-CCNC: 65 U/L — SIGNIFICANT CHANGE UP (ref 40–120)
ALT FLD-CCNC: 25 U/L — SIGNIFICANT CHANGE UP (ref 12–78)
ANION GAP SERPL CALC-SCNC: 6 MMOL/L — SIGNIFICANT CHANGE UP (ref 5–17)
APTT BLD: 22.5 SEC — LOW (ref 27.5–36.3)
AST SERPL-CCNC: 20 U/L — SIGNIFICANT CHANGE UP (ref 15–37)
BASOPHILS # BLD AUTO: 0.05 K/UL — SIGNIFICANT CHANGE UP (ref 0–0.2)
BASOPHILS NFR BLD AUTO: 0.5 % — SIGNIFICANT CHANGE UP (ref 0–2)
BILIRUB SERPL-MCNC: 0.3 MG/DL — SIGNIFICANT CHANGE UP (ref 0.2–1.2)
BUN SERPL-MCNC: 19 MG/DL — SIGNIFICANT CHANGE UP (ref 7–23)
CALCIUM SERPL-MCNC: 8.8 MG/DL — SIGNIFICANT CHANGE UP (ref 8.5–10.1)
CHLORIDE SERPL-SCNC: 108 MMOL/L — SIGNIFICANT CHANGE UP (ref 96–108)
CO2 SERPL-SCNC: 22 MMOL/L — SIGNIFICANT CHANGE UP (ref 22–31)
CREAT SERPL-MCNC: 0.86 MG/DL — SIGNIFICANT CHANGE UP (ref 0.5–1.3)
EOSINOPHIL # BLD AUTO: 0.08 K/UL — SIGNIFICANT CHANGE UP (ref 0–0.5)
EOSINOPHIL NFR BLD AUTO: 0.8 % — SIGNIFICANT CHANGE UP (ref 0–6)
GLUCOSE SERPL-MCNC: 111 MG/DL — HIGH (ref 70–99)
HCT VFR BLD CALC: 24.3 % — LOW (ref 34.5–45)
HGB BLD-MCNC: 7.3 G/DL — LOW (ref 11.5–15.5)
IMM GRANULOCYTES NFR BLD AUTO: 0.2 % — SIGNIFICANT CHANGE UP (ref 0–1.5)
INR BLD: 1.04 RATIO — SIGNIFICANT CHANGE UP (ref 0.88–1.16)
LYMPHOCYTES # BLD AUTO: 2.62 K/UL — SIGNIFICANT CHANGE UP (ref 1–3.3)
LYMPHOCYTES # BLD AUTO: 27.2 % — SIGNIFICANT CHANGE UP (ref 13–44)
MCHC RBC-ENTMCNC: 26.3 PG — LOW (ref 27–34)
MCHC RBC-ENTMCNC: 30 GM/DL — LOW (ref 32–36)
MCV RBC AUTO: 87.4 FL — SIGNIFICANT CHANGE UP (ref 80–100)
MONOCYTES # BLD AUTO: 0.53 K/UL — SIGNIFICANT CHANGE UP (ref 0–0.9)
MONOCYTES NFR BLD AUTO: 5.5 % — SIGNIFICANT CHANGE UP (ref 2–14)
NEUTROPHILS # BLD AUTO: 6.33 K/UL — SIGNIFICANT CHANGE UP (ref 1.8–7.4)
NEUTROPHILS NFR BLD AUTO: 65.8 % — SIGNIFICANT CHANGE UP (ref 43–77)
PLATELET # BLD AUTO: 282 K/UL — SIGNIFICANT CHANGE UP (ref 150–400)
POTASSIUM SERPL-MCNC: 4 MMOL/L — SIGNIFICANT CHANGE UP (ref 3.5–5.3)
POTASSIUM SERPL-SCNC: 4 MMOL/L — SIGNIFICANT CHANGE UP (ref 3.5–5.3)
PROT SERPL-MCNC: 7.5 GM/DL — SIGNIFICANT CHANGE UP (ref 6–8.3)
PROTHROM AB SERPL-ACNC: 11.6 SEC — SIGNIFICANT CHANGE UP (ref 10–12.9)
RBC # BLD: 2.78 M/UL — LOW (ref 3.8–5.2)
RBC # FLD: 19.2 % — HIGH (ref 10.3–14.5)
SODIUM SERPL-SCNC: 136 MMOL/L — SIGNIFICANT CHANGE UP (ref 135–145)
WBC # BLD: 9.63 K/UL — SIGNIFICANT CHANGE UP (ref 3.8–10.5)
WBC # FLD AUTO: 9.63 K/UL — SIGNIFICANT CHANGE UP (ref 3.8–10.5)

## 2019-10-31 PROCEDURE — 85730 THROMBOPLASTIN TIME PARTIAL: CPT

## 2019-10-31 PROCEDURE — 86850 RBC ANTIBODY SCREEN: CPT

## 2019-10-31 PROCEDURE — 85610 PROTHROMBIN TIME: CPT

## 2019-10-31 PROCEDURE — 99283 EMERGENCY DEPT VISIT LOW MDM: CPT

## 2019-10-31 PROCEDURE — 85025 COMPLETE CBC W/AUTO DIFF WBC: CPT

## 2019-10-31 PROCEDURE — 86900 BLOOD TYPING SEROLOGIC ABO: CPT

## 2019-10-31 PROCEDURE — 36415 COLL VENOUS BLD VENIPUNCTURE: CPT

## 2019-10-31 PROCEDURE — 84702 CHORIONIC GONADOTROPIN TEST: CPT

## 2019-10-31 PROCEDURE — 86901 BLOOD TYPING SEROLOGIC RH(D): CPT

## 2019-10-31 PROCEDURE — 80053 COMPREHEN METABOLIC PANEL: CPT

## 2019-10-31 PROCEDURE — 99284 EMERGENCY DEPT VISIT MOD MDM: CPT

## 2019-10-31 RX ORDER — NORETHINDRONE 0.35 MG/1
1 TABLET ORAL
Qty: 30 | Refills: 0
Start: 2019-10-31 | End: 2019-11-14

## 2019-10-31 RX ORDER — TRANEXAMIC ACID 100 MG/ML
1300 INJECTION, SOLUTION INTRAVENOUS ONCE
Refills: 0 | Status: COMPLETED | OUTPATIENT
Start: 2019-10-31 | End: 2019-10-31

## 2019-10-31 RX ORDER — ACETAMINOPHEN 500 MG
2 TABLET ORAL
Qty: 32 | Refills: 0
Start: 2019-10-31 | End: 2019-11-03

## 2019-10-31 RX ADMIN — TRANEXAMIC ACID 1300 MILLIGRAM(S): 100 INJECTION, SOLUTION INTRAVENOUS at 23:04

## 2019-10-31 NOTE — ED STATDOCS - CLINICAL SUMMARY MEDICAL DECISION MAKING FREE TEXT BOX
vaginal bleeding x1 week after norethindrone finished. has menorrhagia &  was scheduled for hysterectomy by Dr. Parra for 10/31/2019 but pt never followed up for pre-op labs & biopsy so surgery was cancelled (per Dr Parra). pt was under the impression that she was supposed to come to ED for hysterectomy. hemoglobin 7.3 but pt without lighteadedness & has been low as 3.5 in past. case discussed with Dr Parra. will give TXA now & represcribe norethindrone.

## 2019-10-31 NOTE — ED ADULT NURSE NOTE - NSIMPLEMENTINTERV_GEN_ALL_ED
Implemented All Universal Safety Interventions:  [place] to call system. Call bell, personal items and telephone within reach. Instruct patient to call for assistance. Room bathroom lighting operational. Non-slip footwear when patient is off stretcher. Physically safe environment: no spills, clutter or unnecessary equipment. Stretcher in lowest position, wheels locked, appropriate side rails in place.

## 2019-10-31 NOTE — ED STATDOCS - NSFOLLOWUPINSTRUCTIONS_ED_ALL_ED_FT
FOLLOW UP WITH PMD  & GYN DR PARRA WITHIN 1-2DAYS, CALL TO MAKE APPOINTMENT  COME BACK TO ED IF YOUR CONDITION WORSENS OR IF YOU DEVELOP FEVER GREATER THAN 100.4F, CHEST PAIN,  SHORTNESS OF BREATH, LIGHTHEADEDNESS  OR ANY OTHER SYMPTOMS CONCERNING TO YOU  call Dr Parra 3pm on monday 11/4/19 [phone] who will arrange biopsy & eventual hysterectomy  TAKE TYLENOL (ACETAMINOPHEN) 650 MG EVERY 6 HOURS AS NEEDED FOR PAIN    IF YOU WERE PRESRCIBED ANY MEDICATIONS FROM TODAY'S VISIT, TAKE THEM AS DIRECTED (NORETHINDRONE)    EGUIMIENTO CON PMD & GYN DR PARRA DENTRO DE 1-2 DÍAS, LLAME PARA HACER JANES DAMIÁN  VUELVA A ED si wen condición empeora o si desarrolla fiebre mayor a 100.4F, dolor en el pecho, dificultad para respirar, ligereza u otros síntomas relacionados con usted  llame al Dr. Parra a las 3 p.m. del lunes 11/4/19 [phone], quien organizará la biopsia y la histerectomía eventual  TOME TYLENOL (ACETAMINOPHEN) 650 MG CADA 6 HORAS SEGÚN NECESIDAD DE DOLOR    SI FUERON PRESRCIBIDOS CUALQUIER MEDICAMENTO DE LA VISITA DE Yohana CRAWFORD janette se le indique (NORETHINDRONE)

## 2019-10-31 NOTE — ED ADULT NURSE NOTE - CHPI ED NUR SYMPTOMS NEG
no dizziness/no fever/no decreased eating/drinking/no tingling/no weakness/no nausea/no vomiting/no chills

## 2019-10-31 NOTE — ED STATDOCS - OBJECTIVE STATEMENT
Pertinent HPI/PMH/PSH/FHx/SHx and Review of Systems contained within  HPI: 43 yo female p/w CC abd pain and vaginal bleeding. Is scheduled for hysterectomy by Dr. Parra for 10/31/2019 so presents to the ED for surgery.   PMH/PSH relevant for: menorrhagia    ROS negative for: fever, Chest pain, SOB, Nausea, vomiting, diarrhea, dysuria    FamilyHx and SocialHx not otherwise contributory Pertinent HPI/PMH/PSH/FHx/SHx and Review of Systems contained within  HPI: 45 yo female p/w CC vaginal bleeding x1 week after norethindrone finished. has menorrhagia &  was scheduled for hysterectomy by Dr. Parra for 10/31/2019 but pt never followed up for pre-op labs & biopsy so surgery was cancelled (per Dr Parra). pt was under the impression that she was supposed to come to ED for hysterectomy. pt never followed up in clinic with Dr Parra & his office was trying to get in touch with her multiple times  PMH/PSH relevant for: menorrhagia    ROS negative for: fever, Chest pain, SOB, Nausea, vomiting, diarrhea, lightheadedness  FamilyHx and SocialHx not otherwise contributory

## 2019-10-31 NOTE — ED STATDOCS - NS ED ROS FT
Review of Systems:  	•	CONSTITUTIONAL: no fever  	•	SKIN: no rash  	•	RESPIRATORY: no shortness of breath  	•	CARDIAC: no chest pain, no palpitations  	•	GI:  no nausea, no vomiting, no diarrhea, +abd pain  	•	GENITO-URINARY:  no dysuria; no hematuria, +vaginal bleeding   	•	MUSCULOSKELETAL:  no back pain  	•	NEUROLOGIC: no weakness  	•	ALLERGY: no rhinitis  	•	PSYCHIATRIC: no anxiety Review of Systems:  	•	CONSTITUTIONAL: no fever  	•	SKIN: no rash  	•	RESPIRATORY: no shortness of breath  	•	CARDIAC: no chest pain, no palpitations  	•	GI:  no nausea, no vomiting, no diarrhea, +abd pain  	•	GENITO-URINARY:  no dysuria; no hematuria, +vaginal bleeding   	•	MUSCULOSKELETAL:  no back pain  	•	NEUROLOGIC: no weakness

## 2019-10-31 NOTE — ED STATDOCS - PATIENT PORTAL LINK FT
You can access the FollowMyHealth Patient Portal offered by [facility] by registering at the following website: [email or web address]. By joining [employer]’s FollowMyHealth portal, you will also be able to view your health information using other applications (apps) compatible with our system.

## 2019-11-14 ENCOUNTER — INPATIENT (INPATIENT)
Facility: HOSPITAL | Age: 44
LOS: 1 days | Discharge: HOME | DRG: 742 | End: 2019-11-16
Attending: OBSTETRICS & GYNECOLOGY | Admitting: OBSTETRICS & GYNECOLOGY
Payer: SELF-PAY

## 2019-11-14 VITALS
TEMPERATURE: 98 F | OXYGEN SATURATION: 100 % | HEIGHT: 60 IN | DIASTOLIC BLOOD PRESSURE: 54 MMHG | SYSTOLIC BLOOD PRESSURE: 150 MMHG | RESPIRATION RATE: 19 BRPM | HEART RATE: 92 BPM | WEIGHT: 169.09 LBS

## 2019-11-14 DIAGNOSIS — N93.8 OTHER SPECIFIED ABNORMAL UTERINE AND VAGINAL BLEEDING: ICD-10-CM

## 2019-11-14 DIAGNOSIS — D50.0 IRON DEFICIENCY ANEMIA SECONDARY TO BLOOD LOSS (CHRONIC): ICD-10-CM

## 2019-11-14 LAB
ALBUMIN SERPL ELPH-MCNC: 3.6 G/DL — SIGNIFICANT CHANGE UP (ref 3.3–5)
ALP SERPL-CCNC: 61 U/L — SIGNIFICANT CHANGE UP (ref 40–120)
ALT FLD-CCNC: 22 U/L — SIGNIFICANT CHANGE UP (ref 12–78)
ANION GAP SERPL CALC-SCNC: 10 MMOL/L — SIGNIFICANT CHANGE UP (ref 5–17)
APPEARANCE UR: CLEAR — SIGNIFICANT CHANGE UP
APTT BLD: 23.6 SEC — LOW (ref 27.5–36.3)
AST SERPL-CCNC: 20 U/L — SIGNIFICANT CHANGE UP (ref 15–37)
BASOPHILS # BLD AUTO: 0.03 K/UL — SIGNIFICANT CHANGE UP (ref 0–0.2)
BASOPHILS NFR BLD AUTO: 0.5 % — SIGNIFICANT CHANGE UP (ref 0–2)
BILIRUB SERPL-MCNC: 0.2 MG/DL — SIGNIFICANT CHANGE UP (ref 0.2–1.2)
BILIRUB UR-MCNC: NEGATIVE — SIGNIFICANT CHANGE UP
BUN SERPL-MCNC: 24 MG/DL — HIGH (ref 7–23)
CALCIUM SERPL-MCNC: 8.5 MG/DL — SIGNIFICANT CHANGE UP (ref 8.5–10.1)
CHLORIDE SERPL-SCNC: 110 MMOL/L — HIGH (ref 96–108)
CO2 SERPL-SCNC: 21 MMOL/L — LOW (ref 22–31)
COLOR SPEC: YELLOW — SIGNIFICANT CHANGE UP
CREAT SERPL-MCNC: 0.94 MG/DL — SIGNIFICANT CHANGE UP (ref 0.5–1.3)
DIFF PNL FLD: ABNORMAL
EOSINOPHIL # BLD AUTO: 0.11 K/UL — SIGNIFICANT CHANGE UP (ref 0–0.5)
EOSINOPHIL NFR BLD AUTO: 1.9 % — SIGNIFICANT CHANGE UP (ref 0–6)
GLUCOSE SERPL-MCNC: 110 MG/DL — HIGH (ref 70–99)
GLUCOSE UR QL: NEGATIVE MG/DL — SIGNIFICANT CHANGE UP
HCG SERPL-ACNC: <1 MIU/ML — SIGNIFICANT CHANGE UP
HCT VFR BLD CALC: 23 % — LOW (ref 34.5–45)
HCT VFR BLD CALC: 28 % — LOW (ref 34.5–45)
HGB BLD-MCNC: 6.4 G/DL — CRITICAL LOW (ref 11.5–15.5)
HGB BLD-MCNC: 8.4 G/DL — LOW (ref 11.5–15.5)
IMM GRANULOCYTES NFR BLD AUTO: 0.3 % — SIGNIFICANT CHANGE UP (ref 0–1.5)
INR BLD: 1 RATIO — SIGNIFICANT CHANGE UP (ref 0.88–1.16)
KETONES UR-MCNC: NEGATIVE — SIGNIFICANT CHANGE UP
LEUKOCYTE ESTERASE UR-ACNC: ABNORMAL
LYMPHOCYTES # BLD AUTO: 1.78 K/UL — SIGNIFICANT CHANGE UP (ref 1–3.3)
LYMPHOCYTES # BLD AUTO: 30.1 % — SIGNIFICANT CHANGE UP (ref 13–44)
MCHC RBC-ENTMCNC: 23.3 PG — LOW (ref 27–34)
MCHC RBC-ENTMCNC: 27.8 GM/DL — LOW (ref 32–36)
MCV RBC AUTO: 83.6 FL — SIGNIFICANT CHANGE UP (ref 80–100)
MONOCYTES # BLD AUTO: 0.55 K/UL — SIGNIFICANT CHANGE UP (ref 0–0.9)
MONOCYTES NFR BLD AUTO: 9.3 % — SIGNIFICANT CHANGE UP (ref 2–14)
NEUTROPHILS # BLD AUTO: 3.43 K/UL — SIGNIFICANT CHANGE UP (ref 1.8–7.4)
NEUTROPHILS NFR BLD AUTO: 57.9 % — SIGNIFICANT CHANGE UP (ref 43–77)
NITRITE UR-MCNC: NEGATIVE — SIGNIFICANT CHANGE UP
PH UR: 6 — SIGNIFICANT CHANGE UP (ref 5–8)
PLATELET # BLD AUTO: 213 K/UL — SIGNIFICANT CHANGE UP (ref 150–400)
POTASSIUM SERPL-MCNC: 4 MMOL/L — SIGNIFICANT CHANGE UP (ref 3.5–5.3)
POTASSIUM SERPL-SCNC: 4 MMOL/L — SIGNIFICANT CHANGE UP (ref 3.5–5.3)
PROT SERPL-MCNC: 7.5 GM/DL — SIGNIFICANT CHANGE UP (ref 6–8.3)
PROT UR-MCNC: 100 MG/DL
PROTHROM AB SERPL-ACNC: 11.1 SEC — SIGNIFICANT CHANGE UP (ref 10–12.9)
RBC # BLD: 2.75 M/UL — LOW (ref 3.8–5.2)
RBC # FLD: 20.4 % — HIGH (ref 10.3–14.5)
SODIUM SERPL-SCNC: 141 MMOL/L — SIGNIFICANT CHANGE UP (ref 135–145)
SP GR SPEC: 1.01 — SIGNIFICANT CHANGE UP (ref 1.01–1.02)
UROBILINOGEN FLD QL: NEGATIVE MG/DL — SIGNIFICANT CHANGE UP
WBC # BLD: 5.92 K/UL — SIGNIFICANT CHANGE UP (ref 3.8–10.5)
WBC # FLD AUTO: 5.92 K/UL — SIGNIFICANT CHANGE UP (ref 3.8–10.5)

## 2019-11-14 PROCEDURE — 82962 GLUCOSE BLOOD TEST: CPT

## 2019-11-14 PROCEDURE — 36430 TRANSFUSION BLD/BLD COMPNT: CPT

## 2019-11-14 PROCEDURE — P9040: CPT

## 2019-11-14 PROCEDURE — 88307 TISSUE EXAM BY PATHOLOGIST: CPT | Mod: 26

## 2019-11-14 PROCEDURE — 36415 COLL VENOUS BLD VENIPUNCTURE: CPT

## 2019-11-14 PROCEDURE — 86923 COMPATIBILITY TEST ELECTRIC: CPT

## 2019-11-14 PROCEDURE — 85014 HEMATOCRIT: CPT

## 2019-11-14 PROCEDURE — 88307 TISSUE EXAM BY PATHOLOGIST: CPT

## 2019-11-14 PROCEDURE — C1889: CPT

## 2019-11-14 PROCEDURE — 85018 HEMOGLOBIN: CPT

## 2019-11-14 RX ORDER — HYDROMORPHONE HYDROCHLORIDE 2 MG/ML
0.5 INJECTION INTRAMUSCULAR; INTRAVENOUS; SUBCUTANEOUS
Refills: 0 | Status: DISCONTINUED | OUTPATIENT
Start: 2019-11-14 | End: 2019-11-14

## 2019-11-14 RX ORDER — KETOROLAC TROMETHAMINE 30 MG/ML
30 SYRINGE (ML) INJECTION ONCE
Refills: 0 | Status: DISCONTINUED | OUTPATIENT
Start: 2019-11-14 | End: 2019-11-14

## 2019-11-14 RX ORDER — SODIUM CHLORIDE 9 MG/ML
1000 INJECTION, SOLUTION INTRAVENOUS
Refills: 0 | Status: DISCONTINUED | OUTPATIENT
Start: 2019-11-14 | End: 2019-11-16

## 2019-11-14 RX ORDER — SODIUM CHLORIDE 9 MG/ML
1000 INJECTION INTRAMUSCULAR; INTRAVENOUS; SUBCUTANEOUS ONCE
Refills: 0 | Status: COMPLETED | OUTPATIENT
Start: 2019-11-14 | End: 2019-11-14

## 2019-11-14 RX ORDER — HYDROMORPHONE HYDROCHLORIDE 2 MG/ML
1 INJECTION INTRAMUSCULAR; INTRAVENOUS; SUBCUTANEOUS EVERY 4 HOURS
Refills: 0 | Status: DISCONTINUED | OUTPATIENT
Start: 2019-11-14 | End: 2019-11-16

## 2019-11-14 RX ORDER — ONDANSETRON 8 MG/1
4 TABLET, FILM COATED ORAL EVERY 6 HOURS
Refills: 0 | Status: DISCONTINUED | OUTPATIENT
Start: 2019-11-14 | End: 2019-11-16

## 2019-11-14 RX ORDER — ONDANSETRON 8 MG/1
4 TABLET, FILM COATED ORAL ONCE
Refills: 0 | Status: DISCONTINUED | OUTPATIENT
Start: 2019-11-14 | End: 2019-11-14

## 2019-11-14 RX ORDER — CEFTRIAXONE 500 MG/1
1000 INJECTION, POWDER, FOR SOLUTION INTRAMUSCULAR; INTRAVENOUS ONCE
Refills: 0 | Status: DISCONTINUED | OUTPATIENT
Start: 2019-11-14 | End: 2019-11-14

## 2019-11-14 RX ORDER — CEFTRIAXONE 500 MG/1
1000 INJECTION, POWDER, FOR SOLUTION INTRAMUSCULAR; INTRAVENOUS ONCE
Refills: 0 | Status: COMPLETED | OUTPATIENT
Start: 2019-11-14 | End: 2019-11-14

## 2019-11-14 RX ORDER — MEPERIDINE HYDROCHLORIDE 50 MG/ML
12.5 INJECTION INTRAMUSCULAR; INTRAVENOUS; SUBCUTANEOUS
Refills: 0 | Status: DISCONTINUED | OUTPATIENT
Start: 2019-11-14 | End: 2019-11-14

## 2019-11-14 RX ORDER — SODIUM CHLORIDE 9 MG/ML
1000 INJECTION, SOLUTION INTRAVENOUS
Refills: 0 | Status: DISCONTINUED | OUTPATIENT
Start: 2019-11-14 | End: 2019-11-14

## 2019-11-14 RX ORDER — FENTANYL CITRATE 50 UG/ML
50 INJECTION INTRAVENOUS
Refills: 0 | Status: DISCONTINUED | OUTPATIENT
Start: 2019-11-14 | End: 2019-11-14

## 2019-11-14 RX ORDER — OXYCODONE AND ACETAMINOPHEN 5; 325 MG/1; MG/1
1 TABLET ORAL EVERY 4 HOURS
Refills: 0 | Status: DISCONTINUED | OUTPATIENT
Start: 2019-11-14 | End: 2019-11-16

## 2019-11-14 RX ORDER — OXYCODONE AND ACETAMINOPHEN 5; 325 MG/1; MG/1
2 TABLET ORAL EVERY 4 HOURS
Refills: 0 | Status: DISCONTINUED | OUTPATIENT
Start: 2019-11-14 | End: 2019-11-16

## 2019-11-14 RX ORDER — IBUPROFEN 200 MG
600 TABLET ORAL EVERY 6 HOURS
Refills: 0 | Status: DISCONTINUED | OUTPATIENT
Start: 2019-11-14 | End: 2019-11-16

## 2019-11-14 RX ADMIN — Medication 600 MILLIGRAM(S): at 23:30

## 2019-11-14 RX ADMIN — CEFTRIAXONE 1000 MILLIGRAM(S): 500 INJECTION, POWDER, FOR SOLUTION INTRAMUSCULAR; INTRAVENOUS at 08:37

## 2019-11-14 RX ADMIN — SODIUM CHLORIDE 1000 MILLILITER(S): 9 INJECTION INTRAMUSCULAR; INTRAVENOUS; SUBCUTANEOUS at 05:04

## 2019-11-14 RX ADMIN — SODIUM CHLORIDE 1000 MILLILITER(S): 9 INJECTION INTRAMUSCULAR; INTRAVENOUS; SUBCUTANEOUS at 06:04

## 2019-11-14 RX ADMIN — Medication 600 MILLIGRAM(S): at 22:37

## 2019-11-14 RX ADMIN — Medication 30 MILLIGRAM(S): at 05:04

## 2019-11-14 RX ADMIN — HYDROMORPHONE HYDROCHLORIDE 0.5 MILLIGRAM(S): 2 INJECTION INTRAMUSCULAR; INTRAVENOUS; SUBCUTANEOUS at 19:00

## 2019-11-14 RX ADMIN — HYDROMORPHONE HYDROCHLORIDE 0.5 MILLIGRAM(S): 2 INJECTION INTRAMUSCULAR; INTRAVENOUS; SUBCUTANEOUS at 18:35

## 2019-11-14 NOTE — BRIEF OPERATIVE NOTE - NSICDXBRIEFPROCEDURE_GEN_ALL_CORE_FT
PROCEDURES:  Cystoscopy 14-Nov-2019 15:08:45  Raji Parra  Bilateral salpingectomy 14-Nov-2019 15:08:32 Raji Burris  Supracervical hysterectomy 14-Nov-2019 15:07:32 Raji Burris  Single incision diagnostic laparoscopy 14-Nov-2019 15:07:22  Raji Parra  Exam under anesthesia, gynecologic 14-Nov-2019 15:07:14  Raji Parra PROCEDURES:  Right ureterolysis 14-Nov-2019 17:32:44 LESS plus one Raji Parra  Bilateral salpingectomy 14-Nov-2019 15:08:32 Raji Burris  Supracervical hysterectomy 14-Nov-2019 15:07:32 Raji Burris  Single incision diagnostic laparoscopy 14-Nov-2019 15:07:22  Raji Parra  Exam under anesthesia, gynecologic 14-Nov-2019 15:07:14  Raji Parra

## 2019-11-14 NOTE — BRIEF OPERATIVE NOTE - SPECIMENS
uterine fundus (   grams) --- right and left fallopian tubes uterine fundus ( 431  grams) --- right and left fallopian tubes

## 2019-11-14 NOTE — ASU DISCHARGE PLAN (ADULT/PEDIATRIC) - CARE PROVIDER_API CALL
Raji Parra)  Obstetrics and Gynecology  [address] [place] [address]  Phone: [phone]  Fax: [phone]  Follow Up Time: 2 weeks

## 2019-11-14 NOTE — BRIEF OPERATIVE NOTE - NSICDXBRIEFPOSTOP_GEN_ALL_CORE_FT
POST-OP DIAGNOSIS:  Menorrhagia 14-Nov-2019 15:09:15  Raji Parra  Fibroid uterus 14-Nov-2019 15:09:08  Raji Parra

## 2019-11-14 NOTE — CONSULT NOTE ADULT - SUBJECTIVE AND OBJECTIVE BOX
Pt is a 45yo  Malagasy speaking F known to Dr. Parra for planned hysterectomy for fibroid uterus on 10/31/19, which she missed and presented to the ED the same evening for vaginal bleeding. Pt resented today for pelvic pain and vaginal bleeding and requests that her hysterectomy be performed today.   Currently pt is complaining of pelvic/lower abdominal pain, states that the vaginal bleeding is minimal at this time.  Pr reports that she was seen at Dr. Parra's office on 11/11 and was started on progesterone to control bleeding. SHe states that despite taking medication as prescribed it is not helping her.    Objectively: Pt is currently comfortable, NAD  ICU Vital Signs Last 24 Hrs  T(C): 36.4 (14 Nov 2019 06:05), Max: 36.9 (14 Nov 2019 04:15)  T(F): 97.6 (14 Nov 2019 06:05), Max: 98.5 (14 Nov 2019 04:15)  HR: 85 (14 Nov 2019 06:05) (81 - 92)  BP: 144/62 (14 Nov 2019 06:05) (144/62 - 156/73)  RR: 18 (14 Nov 2019 06:05) (18 - 19)  SpO2: 100% (14 Nov 2019 06:05) (100% - 100%)                          6.4    5.92  )-----------( 213      ( 14 Nov 2019 04:39 )             23.0     11-14    141  |  110<H>  |  24<H>  ----------------------------<  110<H>  4.0   |  21<L>  |  0.94    Ca    8.5      14 Nov 2019 04:39    TPro  7.5  /  Alb  3.6  /  TBili  0.2  /  DBili  x   /  AST  20  /  ALT  22  /  AlkPhos  61  11-14

## 2019-11-14 NOTE — H&P ADULT - NSHPPHYSICALEXAM_GEN_ALL_CORE
Vital Signs Last 24 Hrs  T(C): 36.9 (14 Nov 2019 07:42), Max: 36.9 (14 Nov 2019 04:15)  T(F): 98.5 (14 Nov 2019 07:42), Max: 98.5 (14 Nov 2019 04:15)  HR: 75 (14 Nov 2019 07:42) (75 - 92)  BP: 127/69 (14 Nov 2019 07:42) (127/69 - 156/73)  RR: 17 (14 Nov 2019 07:42) (17 - 19)  SpO2: 100% (14 Nov 2019 07:42) (100% - 100%)    General: oriented x3   CV: RRR   Abdomen: soft, nontender 12 week fibroid uterus   Extrem: no swelling or calf tenderness

## 2019-11-14 NOTE — BRIEF OPERATIVE NOTE - NSICDXBRIEFPREOP_GEN_ALL_CORE_FT
PRE-OP DIAGNOSIS:  Menorrhagia 14-Nov-2019 15:08:59  Raji Parra  Fibroid uterus 14-Nov-2019 15:08:55  Raji Parra

## 2019-11-14 NOTE — BRIEF OPERATIVE NOTE - OPERATION/FINDINGS
10 week uterus  --- normal adnexa--- normal abdomen ---  normal bladder 14 week uterus  --- normal adnexa--- normal abdomen ---  normal bladder

## 2019-11-14 NOTE — H&P ADULT - ASSESSMENT
45yo  unknown LMP with long-standing history of menorrhagia here for supracervical hysterectomy bilateral salpingectomy   -Admit to Dr. Parra   -NPO, LR 125cc/hr   -Received 1 unit pRBC for asymptomatic anemia Hgb <7. Will receive total of 2 units which was communicated with ER attending.   -Dr. Parra to obtain consent

## 2019-11-14 NOTE — BRIEF OPERATIVE NOTE - COMMENTS
Evicel placed Evicel placedl;  HCT 28.8 at the start of the procudure --- right ureterolysis done with +1 to control bleeding at the right cervical region

## 2019-11-14 NOTE — ED ADULT NURSE REASSESSMENT NOTE - NS ED NURSE REASSESS COMMENT FT1
received pt from juan manuel turpin, pt resting comfortably daughter at bedside, receiving blood transfusion, pt in no distress, aware of plan of care

## 2019-11-14 NOTE — ED ADULT NURSE NOTE - OBJECTIVE STATEMENT
[place]  # [id number] (Roman Catholic ): 45 y/o female awake alert and oriented x4 presents to ED c/o vaginal bleeding x 1 month. Pt was scheduled for hysterectomy on 10/31/19 that she missed. Worsening moira noticed tonight. Unable to tolerate the pain anymore. Denies any fever/chills, chest pain, vomiting.

## 2019-11-14 NOTE — ED PROVIDER NOTE - OBJECTIVE STATEMENT
PI # [id number] Taoism - 43 y/o female in ED c/o chronic vaginal bleeding and suprapubic pain x 1 month.    states was scheduled for hysterectomy on 10/31/19 that she missed.   pt states that she saw Dr Parra on 11/11/19 for same but states he did nothing.    pt states has been taking medication as prescribed.   states no pain meds taken.    pt states tonight unable to tolerate the pain anymore.    pt denies any fever, HA, cp, sob, n/v/d/abd pain.     tolerating PO.    no sick contacts or recent travel.

## 2019-11-14 NOTE — ED PROVIDER NOTE - PROGRESS NOTE DETAILS
case d/w GYN resident and will evaluate pt pt and family upset about questioning as to why no pain meds were taken for pain.  explained to pt that if she is in pain then pain medications would help alleviate the pain. signed blood transfusion form in chart Yajaira MENDEZ: Received s/o from Dr. Ortiz- per ob/gyn; patient to receive additional unit of PRBCs- ordered per ob/gyn team and to go to OR for hysterectomy.

## 2019-11-14 NOTE — ED PROVIDER NOTE - CLINICAL SUMMARY MEDICAL DECISION MAKING FREE TEXT BOX
pt with chronic vaginal bleeding and abd pain.   being w/u by Dr Parra.   will check labs, IVF, meds and gyn consult

## 2019-11-14 NOTE — H&P ADULT - HISTORY OF PRESENT ILLNESS
Pt is a 45yo   known to Dr. Parra for planned hysterectomy for fibroid uterus to be added on this afternoon.   She recently completed course of progesterone to help control bleeding.   Hx of anemia denies shortness of breath, chest pain, dizziness, fainting.     OBHX:   G1  uncomplicated   G2  uncomplicated   PMH: denies   PSH: denies

## 2019-11-14 NOTE — CONSULT NOTE ADULT - ASSESSMENT
43yo F with known fibroid uterus vaginal bleeding and abdominal pain in ED requesting hysterectomy today  Pt is anemic due to blood loss, currently being transfused one unit of pRBC, consider transfusing one more.  Pt is not actively bleeding at this moment, but was bleeding heavily at home    Plan per Dr. Parra

## 2019-11-14 NOTE — ASU DISCHARGE PLAN (ADULT/PEDIATRIC) - CALL YOUR DOCTOR IF YOU HAVE ANY OF THE FOLLOWING:
Pain not relieved by Medications/Fever greater than (need to indicate Fahrenheit or Celsius)/heavy vaginal bleeding/Unable to urinate/Inability to tolerate liquids or foods

## 2019-11-14 NOTE — ED ADULT NURSE NOTE - CHIEF COMPLAINT QUOTE
patient complains of worsening pain across lower abdomen for past week. denies NVD.  also has been having vaginal bleeding for past month, OBGYN dr mei. [phone]//[phone]

## 2019-11-14 NOTE — ED ADULT TRIAGE NOTE - CHIEF COMPLAINT QUOTE
patient complains of worsening pain across lower abdomen for past week. denies NVD.  also has been having vaginal bleeding for past month, OBGYN dr mei.

## 2019-11-15 DIAGNOSIS — N93.9 ABNORMAL UTERINE AND VAGINAL BLEEDING, UNSPECIFIED: ICD-10-CM

## 2019-11-15 RX ORDER — INSULIN LISPRO 100/ML
2 VIAL (ML) SUBCUTANEOUS ONCE
Refills: 0 | Status: COMPLETED | OUTPATIENT
Start: 2019-11-15 | End: 2019-11-15

## 2019-11-15 RX ADMIN — Medication 600 MILLIGRAM(S): at 06:54

## 2019-11-15 RX ADMIN — OXYCODONE AND ACETAMINOPHEN 2 TABLET(S): 5; 325 TABLET ORAL at 20:53

## 2019-11-15 RX ADMIN — OXYCODONE AND ACETAMINOPHEN 2 TABLET(S): 5; 325 TABLET ORAL at 03:48

## 2019-11-15 RX ADMIN — Medication 600 MILLIGRAM(S): at 05:45

## 2019-11-15 RX ADMIN — OXYCODONE AND ACETAMINOPHEN 2 TABLET(S): 5; 325 TABLET ORAL at 21:00

## 2019-11-15 RX ADMIN — OXYCODONE AND ACETAMINOPHEN 2 TABLET(S): 5; 325 TABLET ORAL at 10:30

## 2019-11-15 RX ADMIN — Medication 600 MILLIGRAM(S): at 17:37

## 2019-11-15 RX ADMIN — Medication 2 UNIT(S): at 11:43

## 2019-11-15 RX ADMIN — SODIUM CHLORIDE 75 MILLILITER(S): 9 INJECTION, SOLUTION INTRAVENOUS at 00:05

## 2019-11-15 RX ADMIN — OXYCODONE AND ACETAMINOPHEN 2 TABLET(S): 5; 325 TABLET ORAL at 09:59

## 2019-11-15 RX ADMIN — SODIUM CHLORIDE 75 MILLILITER(S): 9 INJECTION, SOLUTION INTRAVENOUS at 20:54

## 2019-11-15 RX ADMIN — OXYCODONE AND ACETAMINOPHEN 2 TABLET(S): 5; 325 TABLET ORAL at 02:06

## 2019-11-15 RX ADMIN — Medication 600 MILLIGRAM(S): at 11:43

## 2019-11-15 RX ADMIN — OXYCODONE AND ACETAMINOPHEN 2 TABLET(S): 5; 325 TABLET ORAL at 15:29

## 2019-11-15 NOTE — PROGRESS NOTE ADULT - ASSESSMENT
A/P:   NO DYKESIERREZ is a 43yo now POD#1 s/p laparoscopic supracervical hysterectomy bilateral salpingectomy for AUB-L and anemia s/p 2 units pRBC preoperatively

## 2019-11-15 NOTE — PROGRESS NOTE ADULT - SUBJECTIVE AND OBJECTIVE BOX
Postoperative day: 1  surgery: hyst  patient resting comfortably  complaints: none  OR findings and course d/w patient in detail -- all questions answered  nursing input solicited  Focused ROS: negative    Physical exam:    Vital Signs Last 24 Hrs  T(C): 37.1 (15 Nov 2019 05:15), Max: 37.2 (14 Nov 2019 18:24)  T(F): 98.7 (15 Nov 2019 05:15), Max: 98.9 (14 Nov 2019 18:24)  HR: 69 (15 Nov 2019 05:15) (60 - 76)  BP: 103/51 (15 Nov 2019 05:15) (103/51 - 150/71)  BP(mean): --  RR: 16 (14 Nov 2019 20:15) (13 - 17)  SpO2: 100% (15 Nov 2019 05:15) (98% - 100%)      Abdomen: Soft,  appropriately tender, non-distended  Incision is clean dry and intact  Vagina: minimal bleeding  Ext: lower extremities symmetric and without calf tenderness    LABS:                        8.4    x     )-----------( x        ( 14 Nov 2019 21:38 )             28.0     11-14    141  |  110<H>  |  24<H>  ----------------------------<  110<H>  4.0   |  21<L>  |  0.94    Ca    8.5      14 Nov 2019 04:39    TPro  7.5  /  Alb  3.6  /  TBili  0.2  /  DBili  x   /  AST  20  /  ALT  22  /  AlkPhos  61  11-14      Allergies    No Known Allergies    Intolerances          Assessment and Plan  Doing well.  Tolerating regular diet.  Routine post op care.  Plan discharge home  --- routine restrictions  patient given written and verbal discharge instructions

## 2019-11-15 NOTE — PROGRESS NOTE ADULT - PROBLEM SELECTOR PLAN 1
-Pt recovering well   -Voiding, tolerating PO, bowel function normal.   -Continue the current pain medication  -Will advance post operative care as tolerated. Regular diet and ambulation encouraged.   -Will discuss discharge planning with attending, likely home today   -DVT ppx: SCDs when not ambulating.

## 2019-11-15 NOTE — PROGRESS NOTE ADULT - SUBJECTIVE AND OBJECTIVE BOX
NO TAM is a 43yo now POD#1 s/p laparoscopic supracervical hysterectomy bilateral salpingectomy for AUB-L and anemia s/p 2 units pRBC preoperatively     S:    Patient was seen and examined at bedside. Pain is controlled with PRN medication   Tolerating regular diet, denies N/V.   Burgos has been removed, voiding. Ambulating without difficulty.   + flatus/-BM.    O:   T(C): 37.1 (11-15-19 @ 05:15), Max: 37.2 (11-14-19 @ 18:24)  HR: 69 (11-15-19 @ 05:15) (60 - 76)  BP: 103/51 (11-15-19 @ 05:15) (103/51 - 150/71)  RR: 16 (11-14-19 @ 20:15) (13 - 17)  SpO2: 100% (11-15-19 @ 05:15) (98% - 100%)    CV: RRR  Abdomen: soft, nontender, + BS   Incision: Umbilical dressing in place; LLQ port site clean dry and intact w/ dermabond  Extrem: no calf tenderness or edema     11-14-19 @ 07:01  -  11-15-19 @ 07:00  --------------------------------------------------------  IN: 1725 mL / OUT: 400 mL / NET: 1325 mL        Labs:                           8.4    x     )-----------( x        ( 14 Nov 2019 21:38 )             28.0

## 2019-11-16 VITALS
SYSTOLIC BLOOD PRESSURE: 108 MMHG | OXYGEN SATURATION: 100 % | TEMPERATURE: 98 F | DIASTOLIC BLOOD PRESSURE: 45 MMHG | HEART RATE: 65 BPM | RESPIRATION RATE: 18 BRPM

## 2019-11-16 RX ADMIN — Medication 600 MILLIGRAM(S): at 02:00

## 2019-11-16 RX ADMIN — HYDROMORPHONE HYDROCHLORIDE 1 MILLIGRAM(S): 2 INJECTION INTRAMUSCULAR; INTRAVENOUS; SUBCUTANEOUS at 00:50

## 2019-11-16 RX ADMIN — Medication 600 MILLIGRAM(S): at 11:30

## 2019-11-16 RX ADMIN — Medication 600 MILLIGRAM(S): at 00:34

## 2019-11-16 RX ADMIN — HYDROMORPHONE HYDROCHLORIDE 1 MILLIGRAM(S): 2 INJECTION INTRAMUSCULAR; INTRAVENOUS; SUBCUTANEOUS at 02:00

## 2019-11-16 RX ADMIN — OXYCODONE AND ACETAMINOPHEN 2 TABLET(S): 5; 325 TABLET ORAL at 14:10

## 2019-11-16 NOTE — CDI QUERY NOTE - NSCDIOTHERTXTBX_GEN_ALL_CORE_HH
Documentation on chart of  14 week Fibroid uterus and vaginal bleeding.    Anemia due to blood loss and chronic blood loss anemia documented.    HGB 6.4 on admission    Pt. required 2 units PRBC    Urgent supracervical hysterectomy with bilateral salpingectomy performed.     Please clarify a diagnosis based on the above clinical criteria.  A) Acute blood loss anemia requiring 2 units PRBC  B) No evidence of acute blood loss anemia  C) Unable to determine  D) Other ( Please specify condition)

## 2019-11-16 NOTE — DISCHARGE NOTE NURSING/CASE MANAGEMENT/SOCIAL WORK - NSDCPEPT PROEDMA_GEN_ALL_CORE
"Requested Prescriptions   Pending Prescriptions Disp Refills     terazosin (HYTRIN) 5 MG capsule [Pharmacy Med Name: TERAZOSIN HCL 5 MG Capsule]        Last Written Prescription Date:  2-23-18  Last Fill Quantity: 90,   # refills: 0  Last Office Visit: 3-2-18  Future Office visit:      90 capsule 0     Sig: TAKE 1 CAPSULE AT BEDTIME    Alpha Blockers Passed    5/1/2018  4:14 PM       Passed - Blood pressure under 140/90 in past 12 months    BP Readings from Last 3 Encounters:   03/02/18 134/79   02/22/18 159/80   02/08/18 147/75                Passed - Recent (12 mo) or future (30 days) visit within the authorizing provider's specialty    Patient had office visit in the last 12 months or has a visit in the next 30 days with authorizing provider or within the authorizing provider's specialty.  See \"Patient Info\" tab in inbasket, or \"Choose Columns\" in Meds & Orders section of the refill encounter.           Passed - Patient does not have Tadalafil, Vardenafil, or Sildenafil on their medication list       Passed - Patient is 18 years of age or older          " Yes

## 2019-11-16 NOTE — DISCHARGE NOTE NURSING/CASE MANAGEMENT/SOCIAL WORK - PATIENT PORTAL LINK FT
You can access the FollowMyHealth Patient Portal offered by [facility] by registering at the following website: [email or web address]. By joining [facility]’s FollowMyHealth portal, you will also be able to view your health information using other applications (apps) compatible with our system.

## 2019-11-18 DIAGNOSIS — D64.9 ANEMIA, UNSPECIFIED: ICD-10-CM

## 2019-11-21 DIAGNOSIS — D62 ACUTE POSTHEMORRHAGIC ANEMIA: ICD-10-CM

## 2019-11-21 DIAGNOSIS — E66.9 OBESITY, UNSPECIFIED: ICD-10-CM

## 2019-11-21 DIAGNOSIS — N93.8 OTHER SPECIFIED ABNORMAL UTERINE AND VAGINAL BLEEDING: ICD-10-CM

## 2019-11-21 DIAGNOSIS — Z79.899 OTHER LONG TERM (CURRENT) DRUG THERAPY: ICD-10-CM

## 2019-11-21 DIAGNOSIS — D25.9 LEIOMYOMA OF UTERUS, UNSPECIFIED: ICD-10-CM

## 2022-01-07 NOTE — PATIENT PROFILE ADULT - NSPROGENSOURCEINFO_GEN_A_NUR
patient
PAST MEDICAL HISTORY:  Agoraphobia     Anxiety     Atrial fibrillation     Cervical spine pain     Chronic atrial fibrillation herniated disc    COPD (chronic obstructive pulmonary disease)     Diabetes     Hypertension     Tremor

## 2022-05-10 NOTE — CHART NOTE - NSCHARTNOTEFT_GEN_A_CORE
Patient had acute on chronic blood loss anemia and was transfused 2 units pRBCs preoperatively. Upon discharge her H/H was stable and she remained asymptomatic. She will follow-up with Dr. Parra with the expectation that anemia will improve s/p hysterectomy. 1338 pt 27yf c/o epigastric area pain denies pmhx was sent for eval fr urgent care pain since yesterday, pt vss in no acute distress pending eval/dispo

## 2024-02-05 NOTE — PROVIDER CONTACT NOTE (CRITICAL VALUE NOTIFICATION) - NS PROVIDER READ BACK
yes
Conscious, cooperative, alert.   No psychomotor agitation/retardation.   Good eye contact.   Speech: regular rate and rhythm,   Mood: "Depressed, anxious" Affect: appropriate, full range.   Thought Process: linear, goal directed   Thought Content: No Death wish, No Suicidal ideation/intent/plan, No homicidal ideation/intent/plan. No delusions   Perception: No hallucinations   Insight and Judgement: fair  Impulse Control: fair at this time.

## 2024-03-15 NOTE — ED ADULT TRIAGE NOTE - CCCP TRG CHIEF CMPLNT
Spoke with patient via phone.    Informed her of below results.    She verbalized understanding. No further questions.   preop hysterectomy/vaginal bleeding

## 2024-04-15 NOTE — PATIENT PROFILE ADULT - NSPROPOAURINARYCATHETER_GEN_A_NUR
The patient presents today is a 71-year-old female with a large mass on her scalp.  This was recently infected.  She has had this for many years.    Her past medical surgical history meds and allergies family history and social history were reviewed in the epic chart.      On physical exam she has alert no acute distress vital signs afebrile examination of scalp reveals a 4 x 4.5 cm lesion raised with some ulceration.      Impression scalp lesion.      Plan I discussed the findings with the patient in detail.  I told her we will proceed with excision in the operative suite.  All risks indications and complications were discussed.   no